# Patient Record
Sex: FEMALE | Race: BLACK OR AFRICAN AMERICAN | NOT HISPANIC OR LATINO | URBAN - METROPOLITAN AREA
[De-identification: names, ages, dates, MRNs, and addresses within clinical notes are randomized per-mention and may not be internally consistent; named-entity substitution may affect disease eponyms.]

---

## 2017-01-04 RX ORDER — DIPHENHYDRAMINE HCL 50 MG
50 CAPSULE ORAL ONCE
Qty: 0 | Refills: 0 | Status: DISCONTINUED | OUTPATIENT
Start: 2017-01-05 | End: 2017-01-20

## 2017-01-04 RX ORDER — TRASTUZUMAB-DKST 420 MG/20ML
537 INJECTION, POWDER, LYOPHILIZED, FOR SOLUTION INTRAVENOUS ONCE
Qty: 0 | Refills: 0 | Status: DISCONTINUED | OUTPATIENT
Start: 2017-01-05 | End: 2017-01-20

## 2017-01-04 RX ORDER — ACETAMINOPHEN 500 MG
1000 TABLET ORAL ONCE
Qty: 0 | Refills: 0 | Status: DISCONTINUED | OUTPATIENT
Start: 2017-01-05 | End: 2017-01-20

## 2017-01-05 ENCOUNTER — OUTPATIENT (OUTPATIENT)
Dept: OUTPATIENT SERVICES | Facility: HOSPITAL | Age: 62
LOS: 1 days | End: 2017-01-05
Payer: MEDICARE

## 2017-01-05 DIAGNOSIS — C50.919 MALIGNANT NEOPLASM OF UNSPECIFIED SITE OF UNSPECIFIED FEMALE BREAST: ICD-10-CM

## 2017-01-05 DIAGNOSIS — Z96.653 PRESENCE OF ARTIFICIAL KNEE JOINT, BILATERAL: Chronic | ICD-10-CM

## 2017-01-05 DIAGNOSIS — Z96.643 PRESENCE OF ARTIFICIAL HIP JOINT, BILATERAL: Chronic | ICD-10-CM

## 2017-01-05 DIAGNOSIS — Z98.89 OTHER SPECIFIED POSTPROCEDURAL STATES: Chronic | ICD-10-CM

## 2017-01-05 PROCEDURE — 96413 CHEMO IV INFUSION 1 HR: CPT

## 2017-01-18 ENCOUNTER — APPOINTMENT (OUTPATIENT)
Dept: RADIATION ONCOLOGY | Facility: CLINIC | Age: 62
End: 2017-01-18

## 2017-01-18 VITALS — SYSTOLIC BLOOD PRESSURE: 149 MMHG | HEART RATE: 86 BPM | OXYGEN SATURATION: 100 % | DIASTOLIC BLOOD PRESSURE: 84 MMHG

## 2017-01-26 ENCOUNTER — OUTPATIENT (OUTPATIENT)
Dept: OUTPATIENT SERVICES | Facility: HOSPITAL | Age: 62
LOS: 1 days | End: 2017-01-26
Payer: MEDICARE

## 2017-01-26 DIAGNOSIS — C50.919 MALIGNANT NEOPLASM OF UNSPECIFIED SITE OF UNSPECIFIED FEMALE BREAST: ICD-10-CM

## 2017-01-26 DIAGNOSIS — Z96.653 PRESENCE OF ARTIFICIAL KNEE JOINT, BILATERAL: Chronic | ICD-10-CM

## 2017-01-26 DIAGNOSIS — Z98.89 OTHER SPECIFIED POSTPROCEDURAL STATES: Chronic | ICD-10-CM

## 2017-01-26 DIAGNOSIS — Z96.643 PRESENCE OF ARTIFICIAL HIP JOINT, BILATERAL: Chronic | ICD-10-CM

## 2017-01-26 PROCEDURE — 96413 CHEMO IV INFUSION 1 HR: CPT

## 2017-01-26 RX ORDER — ACETAMINOPHEN 500 MG
1000 TABLET ORAL ONCE
Qty: 0 | Refills: 0 | Status: DISCONTINUED | OUTPATIENT
Start: 2017-01-26 | End: 2017-02-10

## 2017-01-26 RX ORDER — TRASTUZUMAB-DKST 420 MG/20ML
542 INJECTION, POWDER, LYOPHILIZED, FOR SOLUTION INTRAVENOUS ONCE
Qty: 0 | Refills: 0 | Status: DISCONTINUED | OUTPATIENT
Start: 2017-01-26 | End: 2017-02-10

## 2017-01-26 RX ORDER — DIPHENHYDRAMINE HCL 50 MG
50 CAPSULE ORAL ONCE
Qty: 0 | Refills: 0 | Status: DISCONTINUED | OUTPATIENT
Start: 2017-01-26 | End: 2017-02-10

## 2017-02-16 ENCOUNTER — OUTPATIENT (OUTPATIENT)
Dept: OUTPATIENT SERVICES | Facility: HOSPITAL | Age: 62
LOS: 1 days | End: 2017-02-16

## 2017-02-16 DIAGNOSIS — Z96.653 PRESENCE OF ARTIFICIAL KNEE JOINT, BILATERAL: Chronic | ICD-10-CM

## 2017-02-16 DIAGNOSIS — C50.919 MALIGNANT NEOPLASM OF UNSPECIFIED SITE OF UNSPECIFIED FEMALE BREAST: ICD-10-CM

## 2017-02-16 DIAGNOSIS — Z98.89 OTHER SPECIFIED POSTPROCEDURAL STATES: Chronic | ICD-10-CM

## 2017-02-16 DIAGNOSIS — Z96.643 PRESENCE OF ARTIFICIAL HIP JOINT, BILATERAL: Chronic | ICD-10-CM

## 2017-03-07 ENCOUNTER — OUTPATIENT (OUTPATIENT)
Dept: OUTPATIENT SERVICES | Facility: HOSPITAL | Age: 62
LOS: 1 days | End: 2017-03-07
Payer: MEDICARE

## 2017-03-07 DIAGNOSIS — Z98.89 OTHER SPECIFIED POSTPROCEDURAL STATES: Chronic | ICD-10-CM

## 2017-03-07 DIAGNOSIS — Z96.653 PRESENCE OF ARTIFICIAL KNEE JOINT, BILATERAL: Chronic | ICD-10-CM

## 2017-03-07 DIAGNOSIS — C50.912 MALIGNANT NEOPLASM OF UNSPECIFIED SITE OF LEFT FEMALE BREAST: ICD-10-CM

## 2017-03-07 DIAGNOSIS — Z96.643 PRESENCE OF ARTIFICIAL HIP JOINT, BILATERAL: Chronic | ICD-10-CM

## 2017-03-07 LAB
ALBUMIN SERPL ELPH-MCNC: 4 G/DL — SIGNIFICANT CHANGE UP (ref 3.4–5)
ALP SERPL-CCNC: 141 U/L — HIGH (ref 40–120)
ALT FLD-CCNC: 16 U/L — SIGNIFICANT CHANGE UP (ref 12–42)
ANION GAP SERPL CALC-SCNC: 7 MMOL/L — LOW (ref 9–16)
APTT BLD: 28.4 SEC — SIGNIFICANT CHANGE UP (ref 27.5–37.4)
AST SERPL-CCNC: 36 U/L — SIGNIFICANT CHANGE UP (ref 15–37)
BILIRUB SERPL-MCNC: 0.8 MG/DL — SIGNIFICANT CHANGE UP (ref 0.2–1.2)
BUN SERPL-MCNC: 13 MG/DL — SIGNIFICANT CHANGE UP (ref 7–23)
CALCIUM SERPL-MCNC: 9.1 MG/DL — SIGNIFICANT CHANGE UP (ref 8.5–10.5)
CHLORIDE SERPL-SCNC: 106 MMOL/L — SIGNIFICANT CHANGE UP (ref 96–108)
CO2 SERPL-SCNC: 26 MMOL/L — SIGNIFICANT CHANGE UP (ref 22–31)
CREAT SERPL-MCNC: 0.93 MG/DL — SIGNIFICANT CHANGE UP (ref 0.5–1.3)
GLUCOSE SERPL-MCNC: 125 MG/DL — HIGH (ref 70–99)
HCT VFR BLD CALC: 37.6 % — SIGNIFICANT CHANGE UP (ref 34.5–45)
HGB BLD-MCNC: 12.8 G/DL — SIGNIFICANT CHANGE UP (ref 11.5–15.5)
INR BLD: 1.04 — SIGNIFICANT CHANGE UP (ref 0.88–1.16)
MCHC RBC-ENTMCNC: 30 PG — SIGNIFICANT CHANGE UP (ref 27–34)
MCHC RBC-ENTMCNC: 34 G/DL — SIGNIFICANT CHANGE UP (ref 32–36)
MCV RBC AUTO: 88.3 FL — SIGNIFICANT CHANGE UP (ref 80–100)
PLATELET # BLD AUTO: 161 K/UL — SIGNIFICANT CHANGE UP (ref 150–400)
POTASSIUM SERPL-MCNC: 3.8 MMOL/L — SIGNIFICANT CHANGE UP (ref 3.5–5.3)
POTASSIUM SERPL-SCNC: 3.8 MMOL/L — SIGNIFICANT CHANGE UP (ref 3.5–5.3)
PROT SERPL-MCNC: 6.9 G/DL — SIGNIFICANT CHANGE UP (ref 6.4–8.2)
PROTHROM AB SERPL-ACNC: 11.5 SEC — SIGNIFICANT CHANGE UP (ref 10–13.1)
RBC # BLD: 4.26 M/UL — SIGNIFICANT CHANGE UP (ref 3.8–5.2)
RBC # FLD: 13.5 % — SIGNIFICANT CHANGE UP (ref 10.3–16.9)
SODIUM SERPL-SCNC: 139 MMOL/L — SIGNIFICANT CHANGE UP (ref 135–145)
WBC # BLD: 3.2 K/UL — LOW (ref 3.8–10.5)
WBC # FLD AUTO: 3.2 K/UL — LOW (ref 3.8–10.5)

## 2017-03-07 PROCEDURE — 85027 COMPLETE CBC AUTOMATED: CPT

## 2017-03-07 PROCEDURE — 85730 THROMBOPLASTIN TIME PARTIAL: CPT

## 2017-03-07 PROCEDURE — 85610 PROTHROMBIN TIME: CPT

## 2017-03-07 PROCEDURE — 80053 COMPREHEN METABOLIC PANEL: CPT

## 2017-03-09 ENCOUNTER — OUTPATIENT (OUTPATIENT)
Dept: OUTPATIENT SERVICES | Facility: HOSPITAL | Age: 62
LOS: 1 days | End: 2017-03-09
Payer: MEDICARE

## 2017-03-09 DIAGNOSIS — Z98.89 OTHER SPECIFIED POSTPROCEDURAL STATES: Chronic | ICD-10-CM

## 2017-03-09 DIAGNOSIS — Z96.643 PRESENCE OF ARTIFICIAL HIP JOINT, BILATERAL: Chronic | ICD-10-CM

## 2017-03-09 DIAGNOSIS — Z96.653 PRESENCE OF ARTIFICIAL KNEE JOINT, BILATERAL: Chronic | ICD-10-CM

## 2017-03-09 PROCEDURE — 36590 REMOVAL TUNNELED CV CATH: CPT

## 2017-04-07 ENCOUNTER — APPOINTMENT (OUTPATIENT)
Dept: BREAST CENTER | Facility: CLINIC | Age: 62
End: 2017-04-07

## 2017-04-07 VITALS
HEART RATE: 105 BPM | TEMPERATURE: 97.9 F | SYSTOLIC BLOOD PRESSURE: 144 MMHG | DIASTOLIC BLOOD PRESSURE: 89 MMHG | BODY MASS INDEX: 33.66 KG/M2 | HEIGHT: 63 IN | WEIGHT: 190 LBS

## 2017-05-09 ENCOUNTER — FORM ENCOUNTER (OUTPATIENT)
Age: 62
End: 2017-05-09

## 2017-05-10 ENCOUNTER — OUTPATIENT (OUTPATIENT)
Dept: OUTPATIENT SERVICES | Facility: HOSPITAL | Age: 62
LOS: 1 days | End: 2017-05-10
Payer: MEDICARE

## 2017-05-10 DIAGNOSIS — Z98.89 OTHER SPECIFIED POSTPROCEDURAL STATES: Chronic | ICD-10-CM

## 2017-05-10 DIAGNOSIS — Z96.653 PRESENCE OF ARTIFICIAL KNEE JOINT, BILATERAL: Chronic | ICD-10-CM

## 2017-05-10 DIAGNOSIS — Z96.643 PRESENCE OF ARTIFICIAL HIP JOINT, BILATERAL: Chronic | ICD-10-CM

## 2017-05-10 PROCEDURE — 77065 DX MAMMO INCL CAD UNI: CPT

## 2017-05-10 PROCEDURE — G0206: CPT | Mod: 26,LT

## 2017-08-14 ENCOUNTER — OTHER (OUTPATIENT)
Age: 62
End: 2017-08-14

## 2017-08-16 ENCOUNTER — APPOINTMENT (OUTPATIENT)
Dept: RADIATION ONCOLOGY | Facility: CLINIC | Age: 62
End: 2017-08-16
Payer: MEDICARE

## 2017-08-16 VITALS
BODY MASS INDEX: 35.81 KG/M2 | OXYGEN SATURATION: 99 % | HEART RATE: 89 BPM | DIASTOLIC BLOOD PRESSURE: 90 MMHG | WEIGHT: 202.13 LBS | SYSTOLIC BLOOD PRESSURE: 149 MMHG

## 2017-08-16 PROCEDURE — 99214 OFFICE O/P EST MOD 30 MIN: CPT

## 2017-10-11 ENCOUNTER — APPOINTMENT (OUTPATIENT)
Dept: BREAST CENTER | Facility: CLINIC | Age: 62
End: 2017-10-11
Payer: MEDICARE

## 2017-10-11 VITALS
SYSTOLIC BLOOD PRESSURE: 150 MMHG | HEIGHT: 63 IN | TEMPERATURE: 97.5 F | HEART RATE: 104 BPM | WEIGHT: 196 LBS | DIASTOLIC BLOOD PRESSURE: 93 MMHG | BODY MASS INDEX: 34.73 KG/M2

## 2017-10-11 DIAGNOSIS — I89.0 LYMPHEDEMA, NOT ELSEWHERE CLASSIFIED: ICD-10-CM

## 2017-10-11 PROCEDURE — 99214 OFFICE O/P EST MOD 30 MIN: CPT

## 2017-11-27 ENCOUNTER — FORM ENCOUNTER (OUTPATIENT)
Age: 62
End: 2017-11-27

## 2017-11-28 ENCOUNTER — OUTPATIENT (OUTPATIENT)
Dept: OUTPATIENT SERVICES | Facility: HOSPITAL | Age: 62
LOS: 1 days | End: 2017-11-28
Payer: MEDICARE

## 2017-11-28 DIAGNOSIS — Z96.653 PRESENCE OF ARTIFICIAL KNEE JOINT, BILATERAL: Chronic | ICD-10-CM

## 2017-11-28 DIAGNOSIS — Z96.643 PRESENCE OF ARTIFICIAL HIP JOINT, BILATERAL: Chronic | ICD-10-CM

## 2017-11-28 DIAGNOSIS — Z98.89 OTHER SPECIFIED POSTPROCEDURAL STATES: Chronic | ICD-10-CM

## 2017-11-28 PROCEDURE — 77049 MRI BREAST C-+ W/CAD BI: CPT

## 2017-11-28 PROCEDURE — A9585: CPT

## 2017-11-28 PROCEDURE — 77059 MRI BREAST BILATERAL: CPT | Mod: 26

## 2017-11-28 PROCEDURE — G0204: CPT | Mod: 26

## 2017-11-28 PROCEDURE — C8937: CPT

## 2017-11-28 PROCEDURE — 0159T: CPT | Mod: 26

## 2017-11-28 PROCEDURE — 77066 DX MAMMO INCL CAD BI: CPT

## 2018-02-21 ENCOUNTER — APPOINTMENT (OUTPATIENT)
Dept: RADIATION ONCOLOGY | Facility: CLINIC | Age: 63
End: 2018-02-21

## 2018-03-28 ENCOUNTER — APPOINTMENT (OUTPATIENT)
Age: 63
End: 2018-03-28
Payer: MEDICARE

## 2018-05-23 ENCOUNTER — APPOINTMENT (OUTPATIENT)
Dept: BREAST CENTER | Facility: CLINIC | Age: 63
End: 2018-05-23
Payer: MEDICARE

## 2018-05-23 VITALS
HEIGHT: 63 IN | HEART RATE: 109 BPM | WEIGHT: 196 LBS | SYSTOLIC BLOOD PRESSURE: 125 MMHG | TEMPERATURE: 97.6 F | DIASTOLIC BLOOD PRESSURE: 81 MMHG | BODY MASS INDEX: 34.73 KG/M2

## 2018-05-23 PROCEDURE — 99213 OFFICE O/P EST LOW 20 MIN: CPT

## 2018-09-05 ENCOUNTER — APPOINTMENT (OUTPATIENT)
Age: 63
End: 2018-09-05
Payer: MEDICARE

## 2018-09-05 PROCEDURE — 99214 OFFICE O/P EST MOD 30 MIN: CPT

## 2018-12-07 ENCOUNTER — APPOINTMENT (OUTPATIENT)
Dept: BREAST CENTER | Facility: CLINIC | Age: 63
End: 2018-12-07
Payer: MEDICARE

## 2018-12-07 VITALS
HEIGHT: 63 IN | BODY MASS INDEX: 34.73 KG/M2 | WEIGHT: 196 LBS | SYSTOLIC BLOOD PRESSURE: 151 MMHG | HEART RATE: 93 BPM | DIASTOLIC BLOOD PRESSURE: 85 MMHG

## 2018-12-07 PROCEDURE — 99213 OFFICE O/P EST LOW 20 MIN: CPT

## 2019-01-15 ENCOUNTER — FORM ENCOUNTER (OUTPATIENT)
Age: 64
End: 2019-01-15

## 2019-01-16 ENCOUNTER — APPOINTMENT (OUTPATIENT)
Dept: MAMMOGRAPHY | Facility: HOSPITAL | Age: 64
End: 2019-01-16
Payer: MEDICARE

## 2019-01-16 ENCOUNTER — OUTPATIENT (OUTPATIENT)
Dept: OUTPATIENT SERVICES | Facility: HOSPITAL | Age: 64
LOS: 1 days | End: 2019-01-16
Payer: MEDICARE

## 2019-01-16 DIAGNOSIS — Z98.89 OTHER SPECIFIED POSTPROCEDURAL STATES: Chronic | ICD-10-CM

## 2019-01-16 DIAGNOSIS — Z96.653 PRESENCE OF ARTIFICIAL KNEE JOINT, BILATERAL: Chronic | ICD-10-CM

## 2019-01-16 DIAGNOSIS — Z96.643 PRESENCE OF ARTIFICIAL HIP JOINT, BILATERAL: Chronic | ICD-10-CM

## 2019-01-16 PROCEDURE — 77063 BREAST TOMOSYNTHESIS BI: CPT | Mod: 26

## 2019-01-16 PROCEDURE — 77063 BREAST TOMOSYNTHESIS BI: CPT

## 2019-01-16 PROCEDURE — 77067 SCR MAMMO BI INCL CAD: CPT

## 2019-01-16 PROCEDURE — 77067 SCR MAMMO BI INCL CAD: CPT | Mod: 26

## 2019-01-23 ENCOUNTER — CHART COPY (OUTPATIENT)
Age: 64
End: 2019-01-23

## 2019-02-26 ENCOUNTER — FORM ENCOUNTER (OUTPATIENT)
Age: 64
End: 2019-02-26

## 2019-02-27 ENCOUNTER — APPOINTMENT (OUTPATIENT)
Dept: MRI IMAGING | Facility: HOSPITAL | Age: 64
End: 2019-02-27
Payer: MEDICARE

## 2019-02-27 ENCOUNTER — OUTPATIENT (OUTPATIENT)
Dept: OUTPATIENT SERVICES | Facility: HOSPITAL | Age: 64
LOS: 1 days | End: 2019-02-27
Payer: MEDICARE

## 2019-02-27 DIAGNOSIS — Z96.643 PRESENCE OF ARTIFICIAL HIP JOINT, BILATERAL: Chronic | ICD-10-CM

## 2019-02-27 DIAGNOSIS — Z96.653 PRESENCE OF ARTIFICIAL KNEE JOINT, BILATERAL: Chronic | ICD-10-CM

## 2019-02-27 DIAGNOSIS — Z98.89 OTHER SPECIFIED POSTPROCEDURAL STATES: Chronic | ICD-10-CM

## 2019-02-27 PROCEDURE — 77049 MRI BREAST C-+ W/CAD BI: CPT | Mod: 26

## 2019-02-27 PROCEDURE — A9585: CPT

## 2019-02-27 PROCEDURE — C8937: CPT

## 2019-02-27 PROCEDURE — 77049 MRI BREAST C-+ W/CAD BI: CPT

## 2019-03-13 ENCOUNTER — APPOINTMENT (OUTPATIENT)
Dept: RADIATION ONCOLOGY | Facility: CLINIC | Age: 64
End: 2019-03-13

## 2019-04-04 ENCOUNTER — APPOINTMENT (OUTPATIENT)
Dept: ULTRASOUND IMAGING | Facility: HOSPITAL | Age: 64
End: 2019-04-04
Payer: MEDICARE

## 2019-04-04 ENCOUNTER — OUTPATIENT (OUTPATIENT)
Dept: OUTPATIENT SERVICES | Facility: HOSPITAL | Age: 64
LOS: 1 days | End: 2019-04-04
Payer: MEDICARE

## 2019-04-04 DIAGNOSIS — Z96.653 PRESENCE OF ARTIFICIAL KNEE JOINT, BILATERAL: Chronic | ICD-10-CM

## 2019-04-04 DIAGNOSIS — Z96.643 PRESENCE OF ARTIFICIAL HIP JOINT, BILATERAL: Chronic | ICD-10-CM

## 2019-04-04 DIAGNOSIS — Z98.89 OTHER SPECIFIED POSTPROCEDURAL STATES: Chronic | ICD-10-CM

## 2019-04-04 PROCEDURE — 76642 ULTRASOUND BREAST LIMITED: CPT | Mod: 26,50

## 2019-04-04 PROCEDURE — 76642 ULTRASOUND BREAST LIMITED: CPT

## 2019-05-16 ENCOUNTER — RESULT REVIEW (OUTPATIENT)
Age: 64
End: 2019-05-16

## 2019-05-17 ENCOUNTER — APPOINTMENT (OUTPATIENT)
Dept: MRI IMAGING | Facility: HOSPITAL | Age: 64
End: 2019-05-17
Payer: MEDICARE

## 2019-05-17 ENCOUNTER — OUTPATIENT (OUTPATIENT)
Dept: OUTPATIENT SERVICES | Facility: HOSPITAL | Age: 64
LOS: 1 days | End: 2019-05-17
Payer: MEDICARE

## 2019-05-17 DIAGNOSIS — Z98.89 OTHER SPECIFIED POSTPROCEDURAL STATES: Chronic | ICD-10-CM

## 2019-05-17 DIAGNOSIS — Z96.653 PRESENCE OF ARTIFICIAL KNEE JOINT, BILATERAL: Chronic | ICD-10-CM

## 2019-05-17 DIAGNOSIS — Z96.643 PRESENCE OF ARTIFICIAL HIP JOINT, BILATERAL: Chronic | ICD-10-CM

## 2019-05-17 PROCEDURE — A4648: CPT

## 2019-05-17 PROCEDURE — A9585: CPT

## 2019-05-17 PROCEDURE — 77065 DX MAMMO INCL CAD UNI: CPT

## 2019-05-17 PROCEDURE — 19085 BX BREAST 1ST LESION MR IMAG: CPT | Mod: LT

## 2019-05-17 PROCEDURE — 19085 BX BREAST 1ST LESION MR IMAG: CPT

## 2019-05-17 PROCEDURE — 77065 DX MAMMO INCL CAD UNI: CPT | Mod: 26,LT

## 2019-05-17 PROCEDURE — 88305 TISSUE EXAM BY PATHOLOGIST: CPT

## 2019-05-17 PROCEDURE — 88341 IMHCHEM/IMCYTCHM EA ADD ANTB: CPT

## 2019-05-24 LAB — SURGICAL PATHOLOGY STUDY: SIGNIFICANT CHANGE UP

## 2019-06-03 ENCOUNTER — RESULT REVIEW (OUTPATIENT)
Age: 64
End: 2019-06-03

## 2019-06-05 ENCOUNTER — APPOINTMENT (OUTPATIENT)
Dept: ULTRASOUND IMAGING | Facility: HOSPITAL | Age: 64
End: 2019-06-05

## 2019-06-11 ENCOUNTER — RESULT REVIEW (OUTPATIENT)
Age: 64
End: 2019-06-11

## 2019-06-14 ENCOUNTER — APPOINTMENT (OUTPATIENT)
Dept: BREAST CENTER | Facility: CLINIC | Age: 64
End: 2019-06-14
Payer: MEDICARE

## 2019-06-14 VITALS
HEIGHT: 63 IN | HEART RATE: 91 BPM | SYSTOLIC BLOOD PRESSURE: 156 MMHG | DIASTOLIC BLOOD PRESSURE: 95 MMHG | BODY MASS INDEX: 36.32 KG/M2 | WEIGHT: 205 LBS

## 2019-06-14 DIAGNOSIS — Z78.9 OTHER SPECIFIED HEALTH STATUS: ICD-10-CM

## 2019-06-14 DIAGNOSIS — Z62.21 CHILD IN WELFARE CUSTODY: ICD-10-CM

## 2019-06-14 PROCEDURE — 99215 OFFICE O/P EST HI 40 MIN: CPT

## 2019-06-14 RX ORDER — FLUTICASONE PROPIONATE AND SALMETEROL 50; 250 UG/1; UG/1
250-50 POWDER RESPIRATORY (INHALATION)
Refills: 0 | Status: ACTIVE | COMMUNITY

## 2019-06-14 RX ORDER — AMLODIPINE BESYLATE 10 MG/1
10 TABLET ORAL
Refills: 0 | Status: ACTIVE | COMMUNITY

## 2019-06-14 RX ORDER — AMLODIPINE BESYLATE 5 MG/1
5 TABLET ORAL
Refills: 0 | Status: DISCONTINUED | COMMUNITY
End: 2019-06-14

## 2019-06-14 RX ORDER — ALBUTEROL SULFATE 90 UG/1
108 (90 BASE) AEROSOL, METERED RESPIRATORY (INHALATION)
Refills: 0 | Status: ACTIVE | COMMUNITY

## 2019-06-14 NOTE — PHYSICAL EXAM
[Normocephalic] : normocephalic [Atraumatic] : atraumatic [Supple] : supple [No Supraclavicular Adenopathy] : no supraclavicular adenopathy [No Thyromegaly] : no thyromegaly [Examined in the supine and seated position] : examined in the supine and seated position [Bra Size: ___] : Bra Size: [unfilled] [Asymmetrical] : asymmetrical [No dominant masses] : no dominant masses left breast [No dominant masses] : no dominant masses in right breast  [No Nipple Retraction] : no right nipple retraction [No Nipple Discharge] : no right nipple discharge [No Axillary Lymphadenopathy] : no left axillary lymphadenopathy [No Swelling] : no swelling [No Edema] : no edema [No Rashes] : no rashes [Full ROM] : full range of motion [No Ulceration] : no ulceration [de-identified] : Pendulous breasts.   [de-identified] : PA inc but postop distortion and post radiation changes. Axillary scar from ALND.

## 2019-06-14 NOTE — PAST MEDICAL HISTORY
[Postmenopausal] : The patient is postmenopausal [Menarche Age ____] : age at menarche was [unfilled] [Menopause Age____] : age at menopause was [unfilled] [unknown] : the patient is unsure of the date of her LMP [Total Preg ___] : G[unfilled] [Live Births ___] : P[unfilled]  [Living ___] : Living: [unfilled] [Age At Live Birth ___] : Age at live birth: [unfilled] [History of Hormone Replacement Treatment] : has no history of hormone replacement treatment [FreeTextEntry5] : - 1982 [FreeTextEntry6] : None [FreeTextEntry7] : Hx of oral contraceptive pills, stopped more than 20 years ago [FreeTextEntry8] : None

## 2019-06-14 NOTE — ASSESSMENT
[FreeTextEntry1] : Brandi is a 63 yo female previously under the care of Dr. Peterson, she was last evaluated in December 2018. Pt with hx of  left breast cancer  (ER+/[place]-/HER2 +), s/p neoadjuvant chemo, wide local excision and ALND (2/29 LNS positive) in 2016. S/p XRT in 2017. On letrozole, managed by Dr. Eid (med onc). \par She does not know her family history, as she was in foster care when she was a child. \par \par -- 1/16/19 ([facility]) b/l mmg: left breast small group of microcalcs, below the lumpectomy site, recommend additional spot compression views. Left breast, subcentimeter nodularity behind the nipple adjacent to lumpectomy site; patient is scheduled for f/u breast MRI. BI-RADS 0. \par -- 2/17/19 ([facility]) MRI breasts: 1 cm enhancing lesion at 7N16.5 in the right breast, recommend US bx if amendable to US. Left breast, 0.6 cm enhancing lesion within the skin at 10N4.5 recommend targeted US. Left breast 0.5 cm 10N6.5 suspicious enhancing lesion, recommend targeted US with biopsy is amendable, otherwise MRI bx is recommended. BI-RADS 4. \par -- 4/4/19 ([facility]) US breasts b/l: No lesion visualized to correspond to the right breast lesion at 7n16.5 seen on MRI; however pt reports a history of a "boil" which has resolved, which may have corresponded, this can be confirmed on the day of the left breast MRI bx. Left breast 0.8 x 0.7 cm hypoechoic lesion within the skin at 10N4.5 corresponds to 0.6 cm lesion seen on MRI; given hx of cancer, biopsy is recommended. No sono abnormality seen at the 10N6.5 to correspond to 0.5 cm lesion seen on MRI; MRI bx is recommended. BI-RADS 4.\par -- 5/17/19 ([facility]) left breast 10n4.5 MRI biopsy path: minute narrow linear structure with atypical epithelial cells. Non-epithelial lined cyst 1.75 mm w/ fibrous wall c/w old fat necrosis; rare minute atrophic lobules showing alterations c/w treatment effect. This biopsy does not explain the presence of the 0.5 cm lesion seen on MRI, please correlate with imaging studies.\par CBE: Post radiation and postop changes on the left with PA incision. Right pendulous breasts. No adenopathy and no LE and has full ROM. \par Written concordance pending, discussed with Dr. Gar, rec is for exc bx of this lesion. Also for the 5 mm lesion at 10N 6.5, rec is for MRI for possible rebx or exc bx with MRI loc at time of surgery of the atypia lesion. \par Discussed with pt recommendations and options. For the 10:00 4.5N atypia, rec is for localized exc bx. For the 5 mm lesion seen only on BRANT 10 N6.5, options are for MRI for possible bx vs MRI localized exc bx at same time as the atypia lesion. IF this option is chosen, would do MRI wire loc x2.  If any malignancy is found, pt would need mastectomy on the left as she has already had BCT.  ALso reviewed option of mastectomy on left, possible prophylactic vs oncoplastic on right given grooves on shoulders and size of breasts and asymmetry.  Could have recon on the left, option to meet with plastic discussed as well as options of types of recon.  PT states for now she desires the wire loc x2 and excisional biopsy. Pt also offered option for genetic testing and she will think about it. \par Will schedule for left breast MRI loc exc biopsy x2.  Discussed procedure and possible second surgery pending results.  Discussed given location of lesions, may not be able to use PA incisions. Will depend on the localization.

## 2019-06-14 NOTE — DATA REVIEWED
[FreeTextEntry1] : \par -- 1/16/19 ([facility]) b/l mmg: left breast small group of microcalcs, below the lumpectomy site, recommend additional spot compression views. Left breast, subcentimeter nodularity behind the nipple adjacent to lumpectomy site; patient is scheduled for f/u breast MRI. BI-RADS 0. \par -- 2/17/19 ([facility]) MRI breasts: 1 cm enhancing lesion at 7N16.5 in the right breast, recommend US bx if amendable to US. Left breast, 0.6 cm enhancing lesion within the skin at 10N4.5 recommend targeted US. Left breast 0.5 cm 10N6.5 suspicious enhancing lesion, recommend targeted US with biopsy is amendable, otherwise MRI bx is recommended. BI-RADS 4. \par -- 4/4/19 ([facility]) US breasts b/l: No lesion visualized to correspond to the right breast lesion at 7n16.5 seen on MRI; however pt reports a history of a "boil" which has resolved, which may have corresponded, this can be confirmed on the day of the left breast MRI bx. Left breast 0.8 x 0.7 cm hypoechoic lesion within the skin at 10N4.5 corresponds to 0.6 cm lesion seen on MRI; given hx of cancer, biopsy is recommended. No sono abnormality seen at the 10N6.5 to correspond to 0.5 cm lesion seen on MRI; MRI bx is recommended. BI-RADS 4.\par -- 5/17/19 ([facility]) left breast 10n4.5 MRI biopsy path: minute narrow linear structure with atypical epithelial cells. Non-epithelial lined cyst 1.75 mm w/ fibrous wall c/w old fat necrosis; rare minute atrophic lobules showing alterations c/w treatment effect. This biopsy does not explain the presence of the 0.5 cm lesion seen on MRI, please correlate with imaging studies. Written concordance pending, discussed with Dr. Gar, rec is for exc bx of this lesion. Also for the 5 mm lesion at 10N 6.5, rec is for MRI for possible rebx or exc bx with MRI loc at time of surgery of the atypia lesion. \par

## 2019-06-14 NOTE — HISTORY OF PRESENT ILLNESS
[FreeTextEntry1] : Brandi is a 63 yo female previously under the care of Dr. Peterson, she was last evaluated in December 2018. Pt with hx of  left breast cancer  (ER+/[place]-/HER2 +), s/p neoadjuvant chemo, wide local excision and ALND (2/29 LNS positive) in 2016. S/p XRT in 2017. On letrozole, managed by Dr. Eid (med onc). \par She does not know her family history, as she was in foster care when she was a child. \par \par -- 1/16/19 ([facility]) b/l mmg: left breast small group of microcalcs, below the lumpectomy site, recommend additional spot compression views. Left breast, subcentimeter nodularity behind the nipple adjacent to lumpectomy site; patient is scheduled for f/u breast MRI. BI-RADS 0. \par -- 2/17/19 ([facility]) MRI breasts: 1 cm enhancing lesion at 7N16.5 in the right breast, recommend US bx if amendable to US. Left breast, 0.6 cm enhancing lesion within the skin at 10N4.5 recommend targeted US. Left breast 0.5 cm 10N6.5 suspicious enhancing lesion, recommend targeted US with biopsy is amendable, otherwise MRI bx is recommended. BI-RADS 4. \par -- 4/4/19 ([facility]) US breasts b/l: No lesion visualized to correspond to the right breast lesion at 7n16.5 seen on MRI; however pt reports a history of a "boil" which has resolved, which may have corresponded, this can be confirmed on the day of the left breast MRI bx. Left breast 0.8 x 0.7 cm hypoechoic lesion within the skin at 10N4.5 corresponds to 0.6 cm lesion seen on MRI; given hx of cancer, biopsy is recommended. No sono abnormality seen at the 10N6.5 to correspond to 0.5 cm lesion seen on MRI; MRI bx is recommended. BI-RADS 4.\par -- 5/17/19 ([facility]) left breast 10n4.5 MRI biopsy path: minute narrow linear structure with atypical epithelial cells. Non-epithelial lined cyst 1.75 mm w/ fibrous wall c/w old fat necrosis; rare minute atrophic lobules showing alterations c/w treatment effect. This biopsy does not explain the presence of the 0.5 cm lesion seen on MRI, please correlate with imaging studies. CONCORDANCE PENDING (radiology was contacted and will look into this). \par Pt without breast complaints.

## 2019-06-25 ENCOUNTER — CHART COPY (OUTPATIENT)
Age: 64
End: 2019-06-25

## 2019-06-25 ENCOUNTER — MESSAGE (OUTPATIENT)
Age: 64
End: 2019-06-25

## 2019-06-26 ENCOUNTER — MESSAGE (OUTPATIENT)
Age: 64
End: 2019-06-26

## 2019-07-22 VITALS
WEIGHT: 201.94 LBS | TEMPERATURE: 98 F | HEART RATE: 91 BPM | SYSTOLIC BLOOD PRESSURE: 163 MMHG | DIASTOLIC BLOOD PRESSURE: 80 MMHG | OXYGEN SATURATION: 99 % | HEIGHT: 63 IN | RESPIRATION RATE: 16 BRPM

## 2019-07-22 NOTE — ASU PATIENT PROFILE, ADULT - PSH
H/O bilateral hip replacements    H/O  section    H/O total knee replacement, bilateral    S/P lumpectomy, left breast  Left axillary node dissection 3/14/16

## 2019-07-23 ENCOUNTER — RESULT REVIEW (OUTPATIENT)
Age: 64
End: 2019-07-23

## 2019-07-23 ENCOUNTER — APPOINTMENT (OUTPATIENT)
Dept: BREAST CENTER | Facility: HOSPITAL | Age: 64
End: 2019-07-23
Payer: MEDICARE

## 2019-07-23 ENCOUNTER — APPOINTMENT (OUTPATIENT)
Dept: MRI IMAGING | Facility: HOSPITAL | Age: 64
End: 2019-07-23

## 2019-07-23 ENCOUNTER — OUTPATIENT (OUTPATIENT)
Dept: OUTPATIENT SERVICES | Facility: HOSPITAL | Age: 64
LOS: 1 days | Discharge: HOME | End: 2019-07-23
Payer: COMMERCIAL

## 2019-07-23 VITALS — SYSTOLIC BLOOD PRESSURE: 135 MMHG | DIASTOLIC BLOOD PRESSURE: 78 MMHG | HEART RATE: 88 BPM | OXYGEN SATURATION: 94 %

## 2019-07-23 DIAGNOSIS — Z96.643 PRESENCE OF ARTIFICIAL HIP JOINT, BILATERAL: Chronic | ICD-10-CM

## 2019-07-23 DIAGNOSIS — Z98.89 OTHER SPECIFIED POSTPROCEDURAL STATES: Chronic | ICD-10-CM

## 2019-07-23 DIAGNOSIS — Z96.653 PRESENCE OF ARTIFICIAL KNEE JOINT, BILATERAL: Chronic | ICD-10-CM

## 2019-07-23 DIAGNOSIS — Z98.890 OTHER SPECIFIED POSTPROCEDURAL STATES: Chronic | ICD-10-CM

## 2019-07-23 PROCEDURE — 76098 X-RAY EXAM SURGICAL SPECIMEN: CPT | Mod: 26

## 2019-07-23 PROCEDURE — 19301 PARTIAL MASTECTOMY: CPT | Mod: LT

## 2019-07-23 PROCEDURE — 88307 TISSUE EXAM BY PATHOLOGIST: CPT

## 2019-07-23 PROCEDURE — C1819: CPT

## 2019-07-23 PROCEDURE — 19287 PERQ DEV BREAST 1ST MR GUIDE: CPT

## 2019-07-23 PROCEDURE — 77065 DX MAMMO INCL CAD UNI: CPT | Mod: 26,LT

## 2019-07-23 PROCEDURE — 76098 X-RAY EXAM SURGICAL SPECIMEN: CPT

## 2019-07-23 PROCEDURE — A9585: CPT

## 2019-07-23 PROCEDURE — 77065 DX MAMMO INCL CAD UNI: CPT

## 2019-07-23 PROCEDURE — 19287 PERQ DEV BREAST 1ST MR GUIDE: CPT | Mod: LT

## 2019-07-23 RX ORDER — ACETAMINOPHEN WITH CODEINE 300MG-30MG
1 TABLET ORAL
Qty: 20 | Refills: 0
Start: 2019-07-23 | End: 2019-07-27

## 2019-07-23 RX ORDER — HYDROMORPHONE HYDROCHLORIDE 2 MG/ML
0.4 INJECTION INTRAMUSCULAR; INTRAVENOUS; SUBCUTANEOUS
Refills: 0 | Status: DISCONTINUED | OUTPATIENT
Start: 2019-07-23 | End: 2019-07-24

## 2019-07-23 NOTE — PACU DISCHARGE NOTE - COMMENTS
discharge instructions given to patient and family who verbalized understanding, denies pain, nausea and vomiting, cleared by Anesthesiologist for discharge home.

## 2019-07-29 ENCOUNTER — APPOINTMENT (OUTPATIENT)
Dept: BREAST CENTER | Facility: CLINIC | Age: 64
End: 2019-07-29
Payer: MEDICARE

## 2019-07-29 VITALS — HEIGHT: 63 IN | DIASTOLIC BLOOD PRESSURE: 89 MMHG | HEART RATE: 104 BPM | SYSTOLIC BLOOD PRESSURE: 152 MMHG

## 2019-07-29 DIAGNOSIS — R92.8 OTHER ABNORMAL AND INCONCLUSIVE FINDINGS ON DIAGNOSTIC IMAGING OF BREAST: ICD-10-CM

## 2019-07-29 LAB — SURGICAL PATHOLOGY STUDY: SIGNIFICANT CHANGE UP

## 2019-07-29 PROCEDURE — 99024 POSTOP FOLLOW-UP VISIT: CPT

## 2019-07-29 NOTE — HISTORY OF PRESENT ILLNESS
[FreeTextEntry1] : Brandi is a 63 yo female presents for post op. She is 6 days s/p left wire loc x 2 excisional biopsy for atypical epithelial cells on 7/23/19. Final surgical pathology pending. \par \par Pt with hx of  left breast cancer  (ER+/[place]-/HER2 +), s/p neoadjuvant chemo, wide local excision and ALND (2/29 LNS positive) in 2016. S/p XRT in 2017. On letrozole, managed by Dr. Eid (med onc). \par She does not know her family history, as she was in foster care when she was a child. \par \par

## 2019-07-29 NOTE — PAST MEDICAL HISTORY
[Postmenopausal] : The patient is postmenopausal [Menarche Age ____] : age at menarche was [unfilled] [Menopause Age____] : age at menopause was [unfilled] [Total Preg ___] : G[unfilled] [unknown] : the patient is unsure of the date of her LMP [Live Births ___] : P[unfilled]  [Living ___] : Living: [unfilled] [Age At Live Birth ___] : Age at live birth: [unfilled] [History of Hormone Replacement Treatment] : has no history of hormone replacement treatment [FreeTextEntry6] : None [FreeTextEntry5] : - 1984 [FreeTextEntry7] : Hx of oral contraceptive pills, stopped more than 20 years ago [FreeTextEntry8] : None

## 2019-07-29 NOTE — DATA REVIEWED
[FreeTextEntry1] : \par -- 1/16/19 ([facility]) b/l mmg: left breast small group of microcalcs, below the lumpectomy site, recommend additional spot compression views. Left breast, subcentimeter nodularity behind the nipple adjacent to lumpectomy site; patient is scheduled for f/u breast MRI. BI-RADS 0. \par -- 2/17/19 ([facility]) MRI breasts: 1 cm enhancing lesion at 7N16.5 in the right breast, recommend US bx if amendable to US. Left breast, 0.6 cm enhancing lesion within the skin at 10N4.5 recommend targeted US. Left breast 0.5 cm 10N6.5 suspicious enhancing lesion, recommend targeted US with biopsy is amendable, otherwise MRI bx is recommended. BI-RADS 4. \par -- 4/4/19 ([facility]) US breasts b/l: No lesion visualized to correspond to the right breast lesion at 7n16.5 seen on MRI; however pt reports a history of a "boil" which has resolved, which may have corresponded, this can be confirmed on the day of the left breast MRI bx. Left breast 0.8 x 0.7 cm hypoechoic lesion within the skin at 10N4.5 corresponds to 0.6 cm lesion seen on MRI; given hx of cancer, biopsy is recommended. No sono abnormality seen at the 10N6.5 to correspond to 0.5 cm lesion seen on MRI; MRI bx is recommended. BI-RADS 4.\par -- 5/17/19 ([facility]) left breast 10n4.5 MRI biopsy path: minute narrow linear structure with atypical epithelial cells. Non-epithelial lined cyst 1.75 mm w/ fibrous wall c/w old fat necrosis; rare minute atrophic lobules showing alterations c/w treatment effect. This biopsy does not explain the presence of the 0.5 cm lesion seen on MRI, please correlate with imaging studies. Written concordance pending, discussed with Dr. Gar, rec is for exc bx of this lesion. Also for the 5 mm lesion at 10N 6.5, rec is for MRI for possible rebx or exc bx with MRI loc at time of surgery of the atypia lesion. \par --7/23/19 ([facility]) left breast wire loc x 2 excisional biopsy for atypia, final surgical pathology pending. ADDENDUM: Final surgical pathology 7/23/19 left breast excisional biopsy benign breast with sclerosing adenosis, fibrocystic changes and associated calcifications, prior biopsy site changes are present

## 2019-07-29 NOTE — PHYSICAL EXAM
[de-identified] : UIQ inc intact, no drainage noted and no evidence of cellulitis.  Peau d'orange from post radiation changes and dependent edema of the breast.

## 2019-07-29 NOTE — ASSESSMENT
[FreeTextEntry1] : Brandi is a 63 yo female presents for post op. She is 6 days s/p left wire loc x 2 excisional biopsy for atypical epithelial cells on 7/23/19. Final surgical pathology pending.  Here with her sister Heather. Pt still thinking about genetic testing. \par Pt not needing pain Rx. \par Pt noticed a little drainage from incision.\par Pt with hx of  left breast cancer  (ER+/[place]-/HER2 +), s/p neoadjuvant chemo, wide local excision and ALND (2/29 LNS positive) in 2016. S/p XRT in 2017. On letrozole, managed by Dr. Eid (med onc). \par She does not know her family history, as she was in foster care when she was a child. \par UIQ inc intact, no drainage noted and no evidence of cellulitis.  Peau d'orange from post radiation changes and dependent edema of the breast.

## 2019-08-23 ENCOUNTER — APPOINTMENT (OUTPATIENT)
Dept: BREAST CENTER | Facility: CLINIC | Age: 64
End: 2019-08-23
Payer: MEDICARE

## 2019-08-23 VITALS
DIASTOLIC BLOOD PRESSURE: 84 MMHG | BODY MASS INDEX: 35.97 KG/M2 | SYSTOLIC BLOOD PRESSURE: 141 MMHG | WEIGHT: 203 LBS | HEIGHT: 63 IN | HEART RATE: 104 BPM

## 2019-08-23 DIAGNOSIS — Z17.0 MALIGNANT NEOPLASM OF CENTRAL PORTION OF LEFT FEMALE BREAST: ICD-10-CM

## 2019-08-23 DIAGNOSIS — C50.112 MALIGNANT NEOPLASM OF CENTRAL PORTION OF LEFT FEMALE BREAST: ICD-10-CM

## 2019-08-23 DIAGNOSIS — C50.919 MALIGNANT NEOPLASM OF UNSPECIFIED SITE OF UNSPECIFIED FEMALE BREAST: ICD-10-CM

## 2019-08-23 DIAGNOSIS — C77.3 MALIGNANT NEOPLASM OF UNSPECIFIED SITE OF UNSPECIFIED FEMALE BREAST: ICD-10-CM

## 2019-08-23 DIAGNOSIS — N60.82 OTHER BENIGN MAMMARY DYSPLASIAS OF LEFT BREAST: ICD-10-CM

## 2019-08-23 DIAGNOSIS — Z85.3 PERSONAL HISTORY OF MALIGNANT NEOPLASM OF BREAST: ICD-10-CM

## 2019-08-23 PROCEDURE — 99024 POSTOP FOLLOW-UP VISIT: CPT

## 2019-08-23 NOTE — HISTORY OF PRESENT ILLNESS
[FreeTextEntry1] : Brandi is a 63 yo female presents for post op. She is s/p left wire loc x 2 excisional biopsy for atypical epithelial cells on 7/23/19. She is here for a 6-week incision check and to review surgical pathology.\par \par Final surgical pathology 7/23/19 left breast excisional biopsy benign breast with sclerosing adenosis, fibrocystic changes and associated calcifications, prior biopsy site changes are present.\par \par Pt with hx of  left breast cancer  (ER+/[place]-/HER2 +), s/p neoadjuvant chemo, wide local excision and ALND (2/29 LNS positive) in 2016. S/p XRT in 2017. On letrozole, managed by Dr. Eid (med onc). \par She does not know her family history, as she was in foster care when she was a child. \par \par -- 1/16/19 ([facility]) b/l mmg: left breast small group of microcalcs, below the lumpectomy site, recommend additional spot compression views. Left breast, subcentimeter nodularity behind the nipple adjacent to lumpectomy site; patient is scheduled for f/u breast MRI. BI-RADS 0. \par -- 2/17/19 ([facility]) MRI breasts: 1 cm enhancing lesion at 7N16.5 in the right breast, recommend US bx if amendable to US. Left breast, 0.6 cm enhancing lesion within the skin at 10N4.5 recommend targeted US. Left breast 0.5 cm 10N6.5 suspicious enhancing lesion, recommend targeted US with biopsy is amendable, otherwise MRI bx is recommended. BI-RADS 4. \par -- 4/4/19 ([facility]) US breasts b/l: No lesion visualized to correspond to the right breast lesion at 7n16.5 seen on MRI; however pt reports a history of a "boil" which has resolved, which may have corresponded, this can be confirmed on the day of the left breast MRI bx. Left breast 0.8 x 0.7 cm hypoechoic lesion within the skin at 10N4.5 corresponds to 0.6 cm lesion seen on MRI; given hx of cancer, biopsy is recommended. No sono abnormality seen at the 10N6.5 to correspond to 0.5 cm lesion seen on MRI; MRI bx is recommended. BI-RADS 4.\par -- 5/17/19 ([facility]) left breast 10n4.5 MRI biopsy path: minute narrow linear structure with atypical epithelial cells. Non-epithelial lined cyst 1.75 mm w/ fibrous wall c/w old fat necrosis; rare minute atrophic lobules showing alterations c/w treatment effect. This biopsy does not explain the presence of the 0.5 cm lesion seen on MRI, please correlate with imaging studies. Written concordance pending, discussed with Dr. Gar, rec is for exc bx of this lesion. Also for the 5 mm lesion at 10N 6.5, rec is for MRI for possible rebx or exc bx with MRI loc at time of surgery of the atypia lesion. \par --7/23/19 ([facility]) left breast wire loc x 2 excisional biopsy for atypia, final surgical pathology pending.  Final surgical pathology: left breast excisional biopsy benign breast with sclerosing adenosis, fibrocystic changes and associated calcifications, prior biopsy site changes are present \par \par

## 2019-08-23 NOTE — DATA REVIEWED
[FreeTextEntry1] : -- 1/16/19 ([facility]) b/l mmg: left breast small group of microcalcs, below the lumpectomy site, recommend additional spot compression views. Left breast, subcentimeter nodularity behind the nipple adjacent to lumpectomy site; patient is scheduled for f/u breast MRI. BI-RADS 0. \par -- 2/17/19 ([facility]) MRI breasts: 1 cm enhancing lesion at 7N16.5 in the right breast, recommend US bx if amendable to US. Left breast, 0.6 cm enhancing lesion within the skin at 10N4.5 recommend targeted US. Left breast 0.5 cm 10N6.5 suspicious enhancing lesion, recommend targeted US with biopsy is amendable, otherwise MRI bx is recommended. BI-RADS 4. \par -- 4/4/19 ([facility]) US breasts b/l: No lesion visualized to correspond to the right breast lesion at 7n16.5 seen on MRI; however pt reports a history of a "boil" which has resolved, which may have corresponded, this can be confirmed on the day of the left breast MRI bx. Left breast 0.8 x 0.7 cm hypoechoic lesion within the skin at 10N4.5 corresponds to 0.6 cm lesion seen on MRI; given hx of cancer, biopsy is recommended. No sono abnormality seen at the 10N6.5 to correspond to 0.5 cm lesion seen on MRI; MRI bx is recommended. BI-RADS 4.\par -- 5/17/19 ([facility]) left breast 10n4.5 MRI biopsy path: minute narrow linear structure with atypical epithelial cells. Non-epithelial lined cyst 1.75 mm w/ fibrous wall c/w old fat necrosis; rare minute atrophic lobules showing alterations c/w treatment effect. This biopsy does not explain the presence of the 0.5 cm lesion seen on MRI, please correlate with imaging studies. Written concordance pending, discussed with Dr. Gar, rec is for exc bx of this lesion. Also for the 5 mm lesion at 10N 6.5, rec is for MRI for possible rebx or exc bx with MRI loc at time of surgery of the atypia lesion. \par --7/23/19 ([facility]) left breast wire loc x 2 excisional biopsy for atypia, final surgical pathology pending. ADDENDUM: Final surgical pathology 7/23/19 left breast excisional biopsy benign breast with sclerosing adenosis, fibrocystic changes and associated calcifications, prior biopsy site changes are present

## 2019-08-23 NOTE — PAST MEDICAL HISTORY
[Postmenopausal] : The patient is postmenopausal [Menarche Age ____] : age at menarche was [unfilled] [unknown] : the patient is unsure of the date of her LMP [Menopause Age____] : age at menopause was [unfilled] [Total Preg ___] : G[unfilled] [Live Births ___] : P[unfilled]  [Living ___] : Living: [unfilled] [Age At Live Birth ___] : Age at live birth: [unfilled] [History of Hormone Replacement Treatment] : has no history of hormone replacement treatment [FreeTextEntry5] : - 1984 [FreeTextEntry6] : None [FreeTextEntry7] : Hx of oral contraceptive pills, stopped more than 20 years ago [FreeTextEntry8] : None

## 2019-08-23 NOTE — ASSESSMENT
[FreeTextEntry1] : Brandi is a 65 yo female presents for post op. She is s/p left wire loc x 2 excisional biopsy for atypical epithelial cells on 7/23/19. She is here for a 6-week incision check and to review surgical pathology.\par \par Final surgical pathology 7/23/19 left breast excisional biopsy benign breast with sclerosing adenosis, fibrocystic changes and associated calcifications, prior biopsy site changes are present.\par \par Pt with hx of  left breast cancer  (ER+/[place]-/HER2 +), s/p neoadjuvant chemo, wide local excision and ALND (2/29 LNS positive) in 2016. S/p XRT in 2017. On letrozole, managed by Dr. Eid (med onc). \par She does not know her family history, as she was in foster care when she was a child. \par \par -- 1/16/19 ([facility]) b/l mmg: left breast small group of microcalcs, below the lumpectomy site, recommend additional spot compression views. Left breast, subcentimeter nodularity behind the nipple adjacent to lumpectomy site; patient is scheduled for f/u breast MRI. BI-RADS 0. \par -- 2/17/19 ([facility]) MRI breasts: 1 cm enhancing lesion at 7N16.5 in the right breast, recommend US bx if amendable to US. Left breast, 0.6 cm enhancing lesion within the skin at 10N4.5 recommend targeted US. Left breast 0.5 cm 10N6.5 suspicious enhancing lesion, recommend targeted US with biopsy is amendable, otherwise MRI bx is recommended. BI-RADS 4. \par -- 4/4/19 ([facility]) US breasts b/l: No lesion visualized to correspond to the right breast lesion at 7n16.5 seen on MRI; however pt reports a history of a "boil" which has resolved, which may have corresponded, this can be confirmed on the day of the left breast MRI bx. Left breast 0.8 x 0.7 cm hypoechoic lesion within the skin at 10N4.5 corresponds to 0.6 cm lesion seen on MRI; given hx of cancer, biopsy is recommended. No sono abnormality seen at the 10N6.5 to correspond to 0.5 cm lesion seen on MRI; MRI bx is recommended. BI-RADS 4.\par -- 5/17/19 ([facility]) left breast 10n4.5 MRI biopsy path: minute narrow linear structure with atypical epithelial cells. Non-epithelial lined cyst 1.75 mm w/ fibrous wall c/w old fat necrosis; rare minute atrophic lobules showing alterations c/w treatment effect. This biopsy does not explain the presence of the 0.5 cm lesion seen on MRI, please correlate with imaging studies. Written concordance pending, discussed with Dr. Gar, rec is for exc bx of this lesion. Also for the 5 mm lesion at 10N 6.5, rec is for MRI for possible rebx or exc bx with MRI loc at time of surgery of the atypia lesion. \par --7/23/19 ([facility]) left breast wire loc x 2 excisional biopsy for atypia, final surgical pathology pending.  Final surgical pathology: left breast excisional biopsy benign breast with sclerosing adenosis, fibrocystic changes and associated calcifications, prior biopsy site changes are present \par CBE: Left UIQ inc intact and healing, post radiation and surgical changes.  \par Reviewed path, left 7/23/19, benign.  Postop mmg due 1/20, f.u after.

## 2019-08-23 NOTE — PHYSICAL EXAM
[de-identified] : UIQ inc intact, no drainage noted and no evidence of cellulitis.  Peau d'orange from post radiation changes and dependent edema of the breast.

## 2020-01-15 ENCOUNTER — APPOINTMENT (OUTPATIENT)
Dept: BREAST CENTER | Facility: CLINIC | Age: 65
End: 2020-01-15
Payer: MEDICARE

## 2020-01-15 VITALS
BODY MASS INDEX: 35.97 KG/M2 | HEART RATE: 105 BPM | WEIGHT: 203 LBS | SYSTOLIC BLOOD PRESSURE: 140 MMHG | OXYGEN SATURATION: 97 % | DIASTOLIC BLOOD PRESSURE: 84 MMHG | HEIGHT: 63 IN | TEMPERATURE: 97.9 F

## 2020-01-15 PROCEDURE — 99213 OFFICE O/P EST LOW 20 MIN: CPT

## 2020-01-15 RX ORDER — NITROFURANTOIN (MONOHYDRATE/MACROCRYSTALS) 25; 75 MG/1; MG/1
100 CAPSULE ORAL
Qty: 14 | Refills: 0 | Status: DISCONTINUED | COMMUNITY
Start: 2020-01-03

## 2020-01-16 NOTE — PHYSICAL EXAM
[Normocephalic] : normocephalic [Atraumatic] : atraumatic [Supple] : supple [No Supraclavicular Adenopathy] : no supraclavicular adenopathy [No dominant masses] : no dominant masses in right breast  [Examined in the supine and seated position] : examined in the supine and seated position [No dominant masses] : no dominant masses left breast [No Nipple Retraction] : no left nipple retraction [No Nipple Discharge] : no left nipple discharge [No Axillary Lymphadenopathy] : no left axillary lymphadenopathy [No Edema] : no edema [No Rashes] : no rashes [No Ulceration] : no ulceration

## 2020-01-16 NOTE — REASON FOR VISIT
[Consultation] : a consultation visit [FreeTextEntry1] : previous hx of left 11:00 invasive mammary cancer (ER positive 40%/[place]-/HER2 equivocal FISH positive) with left axillary lymph node positive for metastatic adenocarcinoma, consistent with breast origin, LCIS and ALH, s/p neoadjuvant chemo, wide local excision and ALND (2/29 LNS positive) in 2016.

## 2020-01-16 NOTE — HISTORY OF PRESENT ILLNESS
[FreeTextEntry1] : Brandi is a 65 yo female previously under the care of Dr. Clarissa Turner, was last evaluated in August 2019. She has a previous hx of left 11:00 invasive mammary cancer (ER positive 40%/[place]-/HER2 equivocal FISH positive) with left axillary lymph node positive for metastatic adenocarcinoma, consistent with breast origin, LCIS and ALH, s/p neoadjuvant chemo, wide local excision and ALND (2/29 LNS positive) in 2016. S/p XRT in 2017. On letrozole, managed by Dr. Eid (med onc) of which she followed up with last month.\par \par To note she underwent a left wire localized excisional biopsy x 2 for atypical epithelial cells on 7/23/19 with benign final surgical pathology. \par \par Discussed importance and implication of genetic testing. Patient understands and would like to think about it.\par \par \par

## 2020-01-16 NOTE — PAST MEDICAL HISTORY
[Postmenopausal] : The patient is postmenopausal [Menarche Age ____] : age at menarche was [unfilled] [Menopause Age____] : age at menopause was [unfilled] [unknown] : the patient is unsure of the date of her LMP [Total Preg ___] : G[unfilled] [Live Births ___] : P[unfilled]  [Living ___] : Living: [unfilled] [Age At Live Birth ___] : Age at live birth: [unfilled] [FreeTextEntry5] : - 1984 [History of Hormone Replacement Treatment] : has no history of hormone replacement treatment [FreeTextEntry6] : None [FreeTextEntry7] : Hx of oral contraceptive pills, stopped more than 20 years ago [FreeTextEntry8] : None

## 2020-01-16 NOTE — DATA REVIEWED
[FreeTextEntry1] : -- 1/16/19 ([facility]) b/l mmg: left breast small group of microcalcs, below the lumpectomy site, recommend additional spot compression views. Left breast, subcentimeter nodularity behind the nipple adjacent to lumpectomy site; patient is scheduled for f/u breast MRI. BI-RADS 0. \par \par -- 2/17/19 ([facility]) MRI breasts: 1 cm enhancing lesion at 7N16.5 in the right breast, recommend US bx if amendable to US. Left breast, 0.6 cm enhancing lesion within the skin at 10N4.5 recommend targeted US. Left breast 0.5 cm 10N6.5 suspicious enhancing lesion, recommend targeted US with biopsy is amendable, otherwise MRI bx is recommended. BI-RADS 4. \par \par -- 4/4/19 ([facility]) US breasts b/l: No lesion visualized to correspond to the right breast lesion at 7n16.5 seen on MRI; however pt reports a history of a "boil" which has resolved, which may have corresponded, this can be confirmed on the day of the left breast MRI bx. Left breast 0.8 x 0.7 cm hypoechoic lesion within the skin at 10N4.5 corresponds to 0.6 cm lesion seen on MRI; given hx of cancer, biopsy is recommended. No sono abnormality seen at the 10N6.5 to correspond to 0.5 cm lesion seen on MRI; MRI bx is recommended. BI-RADS 4.\par \par -- 5/17/19 ([facility]) left breast 10n4.5 MRI biopsy path: minute narrow linear structure with atypical epithelial cells. Non-epithelial lined cyst 1.75 mm w/ fibrous wall c/w old fat necrosis; rare minute atrophic lobules showing alterations c/w treatment effect. This biopsy does not explain the presence of the 0.5 cm lesion seen on MRI, please correlate with imaging studies. CONCORDANCE PENDING (radiology was contacted and will look into this). \par

## 2020-03-09 ENCOUNTER — APPOINTMENT (OUTPATIENT)
Dept: MAMMOGRAPHY | Facility: HOSPITAL | Age: 65
End: 2020-03-09
Payer: MEDICARE

## 2020-03-09 ENCOUNTER — OUTPATIENT (OUTPATIENT)
Dept: OUTPATIENT SERVICES | Facility: HOSPITAL | Age: 65
LOS: 1 days | End: 2020-03-09
Payer: MEDICARE

## 2020-03-09 DIAGNOSIS — Z96.643 PRESENCE OF ARTIFICIAL HIP JOINT, BILATERAL: Chronic | ICD-10-CM

## 2020-03-09 DIAGNOSIS — Z96.653 PRESENCE OF ARTIFICIAL KNEE JOINT, BILATERAL: Chronic | ICD-10-CM

## 2020-03-09 DIAGNOSIS — Z98.89 OTHER SPECIFIED POSTPROCEDURAL STATES: Chronic | ICD-10-CM

## 2020-03-09 DIAGNOSIS — Z98.890 OTHER SPECIFIED POSTPROCEDURAL STATES: Chronic | ICD-10-CM

## 2020-03-09 PROCEDURE — 77067 SCR MAMMO BI INCL CAD: CPT

## 2020-03-09 PROCEDURE — 77063 BREAST TOMOSYNTHESIS BI: CPT

## 2020-03-09 PROCEDURE — 77063 BREAST TOMOSYNTHESIS BI: CPT | Mod: 26

## 2020-03-09 PROCEDURE — 77067 SCR MAMMO BI INCL CAD: CPT | Mod: 26

## 2020-03-27 DIAGNOSIS — N61.1 ABSCESS OF THE BREAST AND NIPPLE: ICD-10-CM

## 2020-03-27 RX ORDER — SULFAMETHOXAZOLE AND TRIMETHOPRIM 800; 160 MG/1; MG/1
800-160 TABLET ORAL
Qty: 22 | Refills: 0 | Status: COMPLETED | COMMUNITY
Start: 2020-03-27 | End: 2020-04-06

## 2020-03-30 ENCOUNTER — OUTPATIENT (OUTPATIENT)
Dept: OUTPATIENT SERVICES | Facility: HOSPITAL | Age: 65
LOS: 1 days | End: 2020-03-30
Payer: COMMERCIAL

## 2020-03-30 ENCOUNTER — APPOINTMENT (OUTPATIENT)
Dept: ULTRASOUND IMAGING | Facility: HOSPITAL | Age: 65
End: 2020-03-30

## 2020-03-30 ENCOUNTER — INPATIENT (INPATIENT)
Facility: HOSPITAL | Age: 65
LOS: 2 days | Discharge: HOME | DRG: 871 | End: 2020-04-02
Attending: INTERNAL MEDICINE | Admitting: STUDENT IN AN ORGANIZED HEALTH CARE EDUCATION/TRAINING PROGRAM
Payer: MEDICARE

## 2020-03-30 ENCOUNTER — APPOINTMENT (OUTPATIENT)
Dept: BREAST CENTER | Facility: CLINIC | Age: 65
End: 2020-03-30

## 2020-03-30 ENCOUNTER — RESULT REVIEW (OUTPATIENT)
Age: 65
End: 2020-03-30

## 2020-03-30 VITALS
DIASTOLIC BLOOD PRESSURE: 77 MMHG | SYSTOLIC BLOOD PRESSURE: 147 MMHG | HEART RATE: 126 BPM | HEIGHT: 63 IN | RESPIRATION RATE: 24 BRPM | OXYGEN SATURATION: 92 % | TEMPERATURE: 99 F | WEIGHT: 195.11 LBS

## 2020-03-30 DIAGNOSIS — Z98.890 OTHER SPECIFIED POSTPROCEDURAL STATES: Chronic | ICD-10-CM

## 2020-03-30 DIAGNOSIS — R06.02 SHORTNESS OF BREATH: ICD-10-CM

## 2020-03-30 DIAGNOSIS — Z71.89 OTHER SPECIFIED COUNSELING: ICD-10-CM

## 2020-03-30 DIAGNOSIS — Z96.643 PRESENCE OF ARTIFICIAL HIP JOINT, BILATERAL: Chronic | ICD-10-CM

## 2020-03-30 DIAGNOSIS — Z29.9 ENCOUNTER FOR PROPHYLACTIC MEASURES, UNSPECIFIED: ICD-10-CM

## 2020-03-30 DIAGNOSIS — Z96.653 PRESENCE OF ARTIFICIAL KNEE JOINT, BILATERAL: Chronic | ICD-10-CM

## 2020-03-30 DIAGNOSIS — J45.909 UNSPECIFIED ASTHMA, UNCOMPLICATED: ICD-10-CM

## 2020-03-30 DIAGNOSIS — J96.01 ACUTE RESPIRATORY FAILURE WITH HYPOXIA: ICD-10-CM

## 2020-03-30 DIAGNOSIS — E66.9 OBESITY, UNSPECIFIED: ICD-10-CM

## 2020-03-30 DIAGNOSIS — N61.1 ABSCESS OF THE BREAST AND NIPPLE: ICD-10-CM

## 2020-03-30 DIAGNOSIS — A41.9 SEPSIS, UNSPECIFIED ORGANISM: ICD-10-CM

## 2020-03-30 DIAGNOSIS — Z98.89 OTHER SPECIFIED POSTPROCEDURAL STATES: Chronic | ICD-10-CM

## 2020-03-30 DIAGNOSIS — B97.21 SARS-ASSOCIATED CORONAVIRUS AS THE CAUSE OF DISEASES CLASSIFIED ELSEWHERE: ICD-10-CM

## 2020-03-30 DIAGNOSIS — C50.919 MALIGNANT NEOPLASM OF UNSPECIFIED SITE OF UNSPECIFIED FEMALE BREAST: ICD-10-CM

## 2020-03-30 LAB
ALBUMIN SERPL ELPH-MCNC: 3.6 G/DL — SIGNIFICANT CHANGE UP (ref 3.3–5)
ALBUMIN SERPL ELPH-MCNC: 3.8 G/DL — SIGNIFICANT CHANGE UP (ref 3.3–5)
ALP SERPL-CCNC: 92 U/L — SIGNIFICANT CHANGE UP (ref 40–120)
ALP SERPL-CCNC: SIGNIFICANT CHANGE UP U/L (ref 40–120)
ALT FLD-CCNC: 13 U/L — SIGNIFICANT CHANGE UP (ref 10–45)
ALT FLD-CCNC: SIGNIFICANT CHANGE UP U/L (ref 10–45)
ANION GAP SERPL CALC-SCNC: 16 MMOL/L — SIGNIFICANT CHANGE UP (ref 5–17)
ANION GAP SERPL CALC-SCNC: 16 MMOL/L — SIGNIFICANT CHANGE UP (ref 5–17)
APTT BLD: 29.5 SEC — SIGNIFICANT CHANGE UP (ref 27.5–36.3)
AST SERPL-CCNC: 62 U/L — HIGH (ref 10–40)
AST SERPL-CCNC: SIGNIFICANT CHANGE UP U/L (ref 10–40)
BASOPHILS # BLD AUTO: 0 K/UL — SIGNIFICANT CHANGE UP (ref 0–0.2)
BASOPHILS # BLD AUTO: 0 K/UL — SIGNIFICANT CHANGE UP (ref 0–0.2)
BASOPHILS NFR BLD AUTO: 0 % — SIGNIFICANT CHANGE UP (ref 0–2)
BASOPHILS NFR BLD AUTO: 0 % — SIGNIFICANT CHANGE UP (ref 0–2)
BILIRUB SERPL-MCNC: 0.7 MG/DL — SIGNIFICANT CHANGE UP (ref 0.2–1.2)
BILIRUB SERPL-MCNC: 0.7 MG/DL — SIGNIFICANT CHANGE UP (ref 0.2–1.2)
BUN SERPL-MCNC: 10 MG/DL — SIGNIFICANT CHANGE UP (ref 7–23)
BUN SERPL-MCNC: 10 MG/DL — SIGNIFICANT CHANGE UP (ref 7–23)
CALCIUM SERPL-MCNC: 8.7 MG/DL — SIGNIFICANT CHANGE UP (ref 8.4–10.5)
CALCIUM SERPL-MCNC: 9 MG/DL — SIGNIFICANT CHANGE UP (ref 8.4–10.5)
CHLORIDE SERPL-SCNC: 102 MMOL/L — SIGNIFICANT CHANGE UP (ref 96–108)
CHLORIDE SERPL-SCNC: 104 MMOL/L — SIGNIFICANT CHANGE UP (ref 96–108)
CO2 SERPL-SCNC: 18 MMOL/L — LOW (ref 22–31)
CO2 SERPL-SCNC: 18 MMOL/L — LOW (ref 22–31)
CREAT SERPL-MCNC: 0.96 MG/DL — SIGNIFICANT CHANGE UP (ref 0.5–1.3)
CREAT SERPL-MCNC: 1.03 MG/DL — SIGNIFICANT CHANGE UP (ref 0.5–1.3)
CRP SERPL-MCNC: 8.73 MG/DL — HIGH (ref 0–0.4)
D DIMER BLD IA.RAPID-MCNC: 384 NG/ML DDU — HIGH
D DIMER BLD IA.RAPID-MCNC: 411 NG/ML DDU — HIGH
EOSINOPHIL # BLD AUTO: 0 K/UL — SIGNIFICANT CHANGE UP (ref 0–0.5)
EOSINOPHIL # BLD AUTO: 0.01 K/UL — SIGNIFICANT CHANGE UP (ref 0–0.5)
EOSINOPHIL NFR BLD AUTO: 0 % — SIGNIFICANT CHANGE UP (ref 0–6)
EOSINOPHIL NFR BLD AUTO: 0.3 % — SIGNIFICANT CHANGE UP (ref 0–6)
ERYTHROCYTE [SEDIMENTATION RATE] IN BLOOD: 41 MM/HR — HIGH
ERYTHROCYTE [SEDIMENTATION RATE] IN BLOOD: 60 MM/HR — HIGH
FERRITIN SERPL-MCNC: 559 NG/ML — HIGH (ref 15–150)
FERRITIN SERPL-MCNC: 560 NG/ML — HIGH (ref 15–150)
GIANT PLATELETS BLD QL SMEAR: PRESENT — SIGNIFICANT CHANGE UP
GLUCOSE SERPL-MCNC: 100 MG/DL — HIGH (ref 70–99)
GLUCOSE SERPL-MCNC: 99 MG/DL — SIGNIFICANT CHANGE UP (ref 70–99)
GRAM STN FLD: SIGNIFICANT CHANGE UP
HBA1C BLD-MCNC: 5.7 % — HIGH (ref 4–5.6)
HCT VFR BLD CALC: 36.9 % — SIGNIFICANT CHANGE UP (ref 34.5–45)
HCT VFR BLD CALC: 39.4 % — SIGNIFICANT CHANGE UP (ref 34.5–45)
HGB BLD-MCNC: 12.1 G/DL — SIGNIFICANT CHANGE UP (ref 11.5–15.5)
HGB BLD-MCNC: 12.9 G/DL — SIGNIFICANT CHANGE UP (ref 11.5–15.5)
HYPOCHROMIA BLD QL: SLIGHT — SIGNIFICANT CHANGE UP
IMM GRANULOCYTES NFR BLD AUTO: 0.5 % — SIGNIFICANT CHANGE UP (ref 0–1.5)
INR BLD: 1.07 — SIGNIFICANT CHANGE UP (ref 0.88–1.16)
LACTATE SERPL-SCNC: 1.1 MMOL/L — SIGNIFICANT CHANGE UP (ref 0.5–2)
LYMPHOCYTES # BLD AUTO: 0.23 K/UL — LOW (ref 1–3.3)
LYMPHOCYTES # BLD AUTO: 0.62 K/UL — LOW (ref 1–3.3)
LYMPHOCYTES # BLD AUTO: 16.3 % — SIGNIFICANT CHANGE UP (ref 13–44)
LYMPHOCYTES # BLD AUTO: 6.1 % — LOW (ref 13–44)
MAGNESIUM SERPL-MCNC: 2.1 MG/DL — SIGNIFICANT CHANGE UP (ref 1.6–2.6)
MANUAL SMEAR VERIFICATION: SIGNIFICANT CHANGE UP
MCHC RBC-ENTMCNC: 28.8 PG — SIGNIFICANT CHANGE UP (ref 27–34)
MCHC RBC-ENTMCNC: 29.1 PG — SIGNIFICANT CHANGE UP (ref 27–34)
MCHC RBC-ENTMCNC: 32.7 GM/DL — SIGNIFICANT CHANGE UP (ref 32–36)
MCHC RBC-ENTMCNC: 32.8 GM/DL — SIGNIFICANT CHANGE UP (ref 32–36)
MCV RBC AUTO: 87.9 FL — SIGNIFICANT CHANGE UP (ref 80–100)
MCV RBC AUTO: 88.7 FL — SIGNIFICANT CHANGE UP (ref 80–100)
MONOCYTES # BLD AUTO: 0.16 K/UL — SIGNIFICANT CHANGE UP (ref 0–0.9)
MONOCYTES # BLD AUTO: 0.18 K/UL — SIGNIFICANT CHANGE UP (ref 0–0.9)
MONOCYTES NFR BLD AUTO: 4.4 % — SIGNIFICANT CHANGE UP (ref 2–14)
MONOCYTES NFR BLD AUTO: 4.7 % — SIGNIFICANT CHANGE UP (ref 2–14)
NEUTROPHILS # BLD AUTO: 2.98 K/UL — SIGNIFICANT CHANGE UP (ref 1.8–7.4)
NEUTROPHILS # BLD AUTO: 3.28 K/UL — SIGNIFICANT CHANGE UP (ref 1.8–7.4)
NEUTROPHILS NFR BLD AUTO: 78.2 % — HIGH (ref 43–77)
NEUTROPHILS NFR BLD AUTO: 86 % — HIGH (ref 43–77)
NEUTS BAND # BLD: 2.6 % — SIGNIFICANT CHANGE UP (ref 0–8)
NRBC # BLD: 0 /100 WBCS — SIGNIFICANT CHANGE UP (ref 0–0)
NT-PROBNP SERPL-SCNC: 198 PG/ML — SIGNIFICANT CHANGE UP (ref 0–300)
OVALOCYTES BLD QL SMEAR: SLIGHT — SIGNIFICANT CHANGE UP
PLAT MORPH BLD: ABNORMAL
PLATELET # BLD AUTO: 168 K/UL — SIGNIFICANT CHANGE UP (ref 150–400)
PLATELET # BLD AUTO: 191 K/UL — SIGNIFICANT CHANGE UP (ref 150–400)
POIKILOCYTOSIS BLD QL AUTO: SLIGHT — SIGNIFICANT CHANGE UP
POLYCHROMASIA BLD QL SMEAR: SLIGHT — SIGNIFICANT CHANGE UP
POTASSIUM SERPL-MCNC: 4 MMOL/L — SIGNIFICANT CHANGE UP (ref 3.5–5.3)
POTASSIUM SERPL-MCNC: SIGNIFICANT CHANGE UP MMOL/L (ref 3.5–5.3)
POTASSIUM SERPL-SCNC: 4 MMOL/L — SIGNIFICANT CHANGE UP (ref 3.5–5.3)
POTASSIUM SERPL-SCNC: SIGNIFICANT CHANGE UP MMOL/L (ref 3.5–5.3)
PROCALCITONIN SERPL-MCNC: 0.11 NG/ML — HIGH (ref 0.02–0.1)
PROT SERPL-MCNC: 7 G/DL — SIGNIFICANT CHANGE UP (ref 6–8.3)
PROT SERPL-MCNC: 7.2 G/DL — SIGNIFICANT CHANGE UP (ref 6–8.3)
PROTHROM AB SERPL-ACNC: 12.2 SEC — SIGNIFICANT CHANGE UP (ref 10–12.9)
RBC # BLD: 4.2 M/UL — SIGNIFICANT CHANGE UP (ref 3.8–5.2)
RBC # BLD: 4.44 M/UL — SIGNIFICANT CHANGE UP (ref 3.8–5.2)
RBC # FLD: 13.2 % — SIGNIFICANT CHANGE UP (ref 10.3–14.5)
RBC # FLD: 13.2 % — SIGNIFICANT CHANGE UP (ref 10.3–14.5)
RBC BLD AUTO: ABNORMAL
SARS-COV-2 RNA SPEC QL NAA+PROBE: DETECTED
SODIUM SERPL-SCNC: 136 MMOL/L — SIGNIFICANT CHANGE UP (ref 135–145)
SODIUM SERPL-SCNC: 138 MMOL/L — SIGNIFICANT CHANGE UP (ref 135–145)
SPECIMEN SOURCE: SIGNIFICANT CHANGE UP
SPHEROCYTES BLD QL SMEAR: SLIGHT — SIGNIFICANT CHANGE UP
TROPONIN T SERPL-MCNC: <0.01 NG/ML — SIGNIFICANT CHANGE UP (ref 0–0.01)
VARIANT LYMPHS # BLD: 0.9 % — SIGNIFICANT CHANGE UP (ref 0–6)
WBC # BLD: 3.7 K/UL — LOW (ref 3.8–10.5)
WBC # BLD: 3.81 K/UL — SIGNIFICANT CHANGE UP (ref 3.8–10.5)
WBC # FLD AUTO: 3.7 K/UL — LOW (ref 3.8–10.5)
WBC # FLD AUTO: 3.81 K/UL — SIGNIFICANT CHANGE UP (ref 3.8–10.5)

## 2020-03-30 PROCEDURE — 93010 ELECTROCARDIOGRAM REPORT: CPT

## 2020-03-30 PROCEDURE — 99285 EMERGENCY DEPT VISIT HI MDM: CPT

## 2020-03-30 PROCEDURE — 71045 X-RAY EXAM CHEST 1 VIEW: CPT | Mod: 26

## 2020-03-30 PROCEDURE — 19000 PUNCTURE ASPIR CYST BREAST: CPT | Mod: LT

## 2020-03-30 PROCEDURE — 19000 PUNCTURE ASPIR CYST BREAST: CPT

## 2020-03-30 PROCEDURE — 76642 ULTRASOUND BREAST LIMITED: CPT | Mod: 26,LT

## 2020-03-30 PROCEDURE — 76642 ULTRASOUND BREAST LIMITED: CPT

## 2020-03-30 PROCEDURE — 10005 FNA BX W/US GDN 1ST LES: CPT

## 2020-03-30 PROCEDURE — 76942 ECHO GUIDE FOR BIOPSY: CPT

## 2020-03-30 PROCEDURE — 99223 1ST HOSP IP/OBS HIGH 75: CPT | Mod: GC

## 2020-03-30 PROCEDURE — 76942 ECHO GUIDE FOR BIOPSY: CPT | Mod: 26,59

## 2020-03-30 RX ORDER — ALBUTEROL 90 UG/1
1 AEROSOL, METERED ORAL EVERY 6 HOURS
Refills: 0 | Status: DISCONTINUED | OUTPATIENT
Start: 2020-03-30 | End: 2020-03-31

## 2020-03-30 RX ORDER — INFLUENZA VIRUS VACCINE 15; 15; 15; 15 UG/.5ML; UG/.5ML; UG/.5ML; UG/.5ML
0.5 SUSPENSION INTRAMUSCULAR ONCE
Refills: 0 | Status: DISCONTINUED | OUTPATIENT
Start: 2020-03-30 | End: 2020-04-02

## 2020-03-30 RX ORDER — ACETAMINOPHEN 500 MG
650 TABLET ORAL ONCE
Refills: 0 | Status: COMPLETED | OUTPATIENT
Start: 2020-03-30 | End: 2020-03-30

## 2020-03-30 RX ORDER — FLUTICASONE PROPIONATE AND SALMETEROL 50; 250 UG/1; UG/1
0 POWDER ORAL; RESPIRATORY (INHALATION)
Qty: 0 | Refills: 0 | DISCHARGE

## 2020-03-30 RX ORDER — ALBUTEROL 90 UG/1
2 AEROSOL, METERED ORAL EVERY 4 HOURS
Refills: 0 | Status: DISCONTINUED | OUTPATIENT
Start: 2020-03-30 | End: 2020-03-31

## 2020-03-30 RX ORDER — ALBUTEROL 90 UG/1
2 AEROSOL, METERED ORAL
Qty: 0 | Refills: 0 | DISCHARGE

## 2020-03-30 RX ORDER — AMLODIPINE BESYLATE 2.5 MG/1
1 TABLET ORAL
Qty: 0 | Refills: 0 | DISCHARGE

## 2020-03-30 RX ORDER — ACETAMINOPHEN 500 MG
650 TABLET ORAL EVERY 6 HOURS
Refills: 0 | Status: DISCONTINUED | OUTPATIENT
Start: 2020-03-30 | End: 2020-04-02

## 2020-03-30 RX ORDER — BUDESONIDE AND FORMOTEROL FUMARATE DIHYDRATE 160; 4.5 UG/1; UG/1
2 AEROSOL RESPIRATORY (INHALATION)
Refills: 0 | Status: DISCONTINUED | OUTPATIENT
Start: 2020-03-30 | End: 2020-04-02

## 2020-03-30 RX ORDER — AMLODIPINE BESYLATE 2.5 MG/1
10 TABLET ORAL DAILY
Refills: 0 | Status: DISCONTINUED | OUTPATIENT
Start: 2020-03-30 | End: 2020-04-02

## 2020-03-30 RX ORDER — ALBUTEROL 90 UG/1
2 AEROSOL, METERED ORAL EVERY 4 HOURS
Refills: 0 | Status: DISCONTINUED | OUTPATIENT
Start: 2020-03-30 | End: 2020-03-30

## 2020-03-30 RX ORDER — LETROZOLE 2.5 MG/1
2.5 TABLET, FILM COATED ORAL DAILY
Refills: 0 | Status: DISCONTINUED | OUTPATIENT
Start: 2020-03-30 | End: 2020-04-02

## 2020-03-30 RX ORDER — LETROZOLE 2.5 MG/1
1 TABLET, FILM COATED ORAL
Qty: 0 | Refills: 0 | DISCHARGE

## 2020-03-30 RX ADMIN — BUDESONIDE AND FORMOTEROL FUMARATE DIHYDRATE 2 PUFF(S): 160; 4.5 AEROSOL RESPIRATORY (INHALATION) at 17:55

## 2020-03-30 RX ADMIN — Medication 100 MILLIGRAM(S): at 21:17

## 2020-03-30 RX ADMIN — Medication 650 MILLIGRAM(S): at 21:17

## 2020-03-30 RX ADMIN — Medication 650 MILLIGRAM(S): at 12:05

## 2020-03-30 RX ADMIN — LETROZOLE 2.5 MILLIGRAM(S): 2.5 TABLET, FILM COATED ORAL at 21:17

## 2020-03-30 NOTE — ED PROVIDER NOTE - PHYSICAL EXAMINATION
resp: mild tachypnea, CTAB.  RN chaperone: L breast medial aspect ~9 o clock position: +open wound where I and D performed, minimal surrounding firmness, no fluctuance/crepitus, no active draining. minimal surrounding warmth and localized swelling.

## 2020-03-30 NOTE — ED ADULT NURSE NOTE - ED CARDIAC CAPILLARY REFILL
Pt p/w cough, post tussive emesis and congestion starting Wednesday, and fever x 2 days. Dx with ear infection yesterday. Adequate PO intake and urine output. Pt asleep and easily arouses, wet cough, BS diminished with coarse crackles noted. Abdominal breathing and suprasternal retractions noted. Skin hot/dry, BCR. Radial pulse palpated.  NKDA, IUTD, no PMH
2 seconds or less

## 2020-03-30 NOTE — H&P ADULT - NSICDXPASTSURGICALHX_GEN_ALL_CORE_FT
PAST SURGICAL HISTORY:  H/O bilateral hip replacements     H/O  section     H/O total knee replacement, bilateral     S/P lumpectomy, left breast Left axillary node dissection 3/14/16

## 2020-03-30 NOTE — H&P ADULT - PROBLEM SELECTOR PLAN 8
hx of breast cancer in 2015. S/p chemo/radiation/lumpectomy. now on lestrozole.  - c/w lestrozole home dose

## 2020-03-30 NOTE — ED PROVIDER NOTE - PROGRESS NOTE DETAILS
Klepfish: some labs hemolyzed, repeat labs sent. US IV obtained RUE. CXR b/l infiltrates. satting low 90s RA, improved w/ NC. Much more comfortable at rest. Will admit for concern for COVID, hypoxic. Clinically does not require higher level of care at this time.   updated pt.

## 2020-03-30 NOTE — ED ADULT NURSE NOTE - NSIMPLEMENTINTERV_GEN_ALL_ED
Implemented All Universal Safety Interventions:  [place] to call system. Call bell, personal items and telephone within reach. Instruct patient to call for assistance. Room bathroom lighting operational. Non-slip footwear when patient is off stretcher. Physically safe environment: no spills, clutter or unnecessary equipment. Stretcher in lowest position, wheels locked, appropriate side rails in place.

## 2020-03-30 NOTE — H&P ADULT - HISTORY OF PRESENT ILLNESS
HPI:  65 obese F, with hx of asthma (LABA +ICS, PRN STAN, last exacerbation in fall, never intubated), breast cancer s/p chemo/radiation/lumpectomy in 2015 now on letrozole. Presents with x3 day history of RICHMOND and associated productive cough with reddish phlegm and speckled blood and decreased PO intake, no associated fevers, chills, cp, pleuritic cp, wheezing, or LE edema. Symptoms not alleviated with home inhaler. SOB worse with exertion. Patient with recent sick contact in  who is home with a cough but they have been sleeping on different floors. SOB felt worse today therefore presented to ED.    of note: Patient with recent breast pain, and diagnosed with breast abscess- now with diarrhea after her doctor prescribed her bactrim which she has taken for last three days. Now status post ID this am with her PCP.     Date of onset of fever: N/a  Date of onset of dyspnea: x3 days 3/28  Recent Travel: none  Sick Contacts: -cough/uri like symptoms  COVID Exposure: unknown  Close Contacts:  and daughter    ROS:  Fevers: Y/N: NO  Malaise: Y/N: YES  Myalgias: Y/N: no  SOB: Y/N  : YES       At rest: Y/N: NO         With exertion: Y/N: YES         At baseline: Y/ N: NO  Cough: Y/N : YES       Productive: Y/N: YES  Nausea: Y/N: NO  Vomiting: Y/N: NO  Diarrhea: Y/N: YES    PMHx:   HTN: Y/N: YES  DM: Y/N: NO  Lung Disease: Y/N: YES       Specify: ASTHMA  Cardiovascular disease: Y/N: NO  Malignancy: Y/N: HX of malignancy in 2015  Immunosuppression: Y/N: no  HIV: Y/N: no  CKD/ESRD: Y/N: no  Chronic Liver Disease: Y/N: no    SoHx:  Tobacco use: Y/N: NO  Vape use: Y/N: NO  Health care worker: Y/N: NO

## 2020-03-30 NOTE — H&P ADULT - NSHPLABSRESULTS_GEN_ALL_CORE
.  LABS:                         12.1   3.70  )-----------( 168      ( 30 Mar 2020 13:34 )             36.9     03-30    136  |  102  |  10  ----------------------------<  99  4.0   |  18<L>  |  1.03    Ca    8.7      30 Mar 2020 13:26  Mg     2.1     03-30    TPro  7.0  /  Alb  3.6  /  TBili  0.7  /  DBili  x   /  AST  62<H>  /  ALT  13  /  AlkPhos  92  03-30    PT/INR - ( 30 Mar 2020 13:26 )   PT: 12.2 sec;   INR: 1.07          PTT - ( 30 Mar 2020 13:26 )  PTT:29.5 sec    CARDIAC MARKERS ( 30 Mar 2020 13:26 )  x     / <0.01 ng/mL / x     / x     / x          Serum Pro-Brain Natriuretic Peptide: 198 pg/mL (03-30 @ 13:26)    Lactate, Blood: 1.1 mmol/L (03-30 @ 13:34)      RADIOLOGY, EKG & ADDITIONAL TESTS: Reviewed.

## 2020-03-30 NOTE — H&P ADULT - PROBLEM SELECTOR PLAN 1
Patient presented with tachypnea and SPo2 92% now requiring supplemental O2 to maintain O2 sat > 93% in setting of three day hx of RICHMOND. PE with no wheezing, Labs notable for lymphopenia (0.62), elevated inflammatory markers: CRP 8.73, ferritin 560, elevated D-dimer 411. CXR as above with questionable hazy bilateral opacifications. COVID PCR +. Patient with respiratory failure likely due to covid, low suspicion for asthma exacerbation as patient without wheezing, and symptoms not improved with albuterol.  - Maintain O2 >92%, supplemental oxygen as needed  - MDI as needed given + covid status  - see management below. Patient presented with tachypnea and SPo2 92% now requiring supplemental O2 to maintain O2 sat > 93% in setting of three day hx of RICHMOND. PE with no wheezing, Labs notable for lymphopenia (0.62), elevated inflammatory markers: CRP 8.73, ferritin 560, elevated D-dimer 411. CXR as above with questionable hazy bilateral opacifications. COVID PCR +. Other etiologies in include PE and wells score 1.5, with elevated d-dimer (likely elevated secondary to covid) Patient with respiratory failure likely due to covid, low suspicion for asthma exacerbation as patient without wheezing, and symptoms not improved with albuterol.  - Maintain O2 >92%, supplemental oxygen as needed  - MDI as needed given + covid status  - see management below.  - if clinically warrented Patient presented with tachypnea and SPo2 92% now requiring supplemental O2 to maintain O2 sat > 93% in setting of three day hx of RICHMOND. PE with no wheezing, Labs notable for lymphopenia (0.62), elevated inflammatory markers: CRP 8.73, ferritin 560, elevated D-dimer 411. CXR as above with questionable hazy bilateral opacifications. COVID PCR +. Other etiologies in include PE and wells score 1.5, with elevated d-dimer (likely elevated secondary to covid) Patient with respiratory failure likely due to covid, low suspicion for asthma exacerbation as patient without wheezing, and symptoms not improved with albuterol.  - Maintain O2 >92%, supplemental oxygen as needed  - MDI as needed given + covid status  - see management below.  - if clinically warranted--> patient with worsening repsiratory status in addition to elevated bnp, and troponin -->get CT Pe

## 2020-03-30 NOTE — ED ADULT NURSE NOTE - OBJECTIVE STATEMENT
Pt presents short of breath especially while walking, pt states it feels like an asthma exacerbation but isnt improving with her resuce inhaler. Pt presents with dry hacking cough, no oral fever but tachycardic and hot to touch, Pt declines rectal temp. PT reports hx of asthma and left breast CA in 2015 treated with lympmnodectomy chemo and radiation.

## 2020-03-30 NOTE — ED PROVIDER NOTE - CLINICAL SUMMARY MEDICAL DECISION MAKING FREE TEXT BOX
65F PMH asthma (never intubated), breast ca (s/p lumpectomy/chemo/radiation 2015) p/w SOB. Pt w/ 2-3d of worsening SOB, feels like her asthma, no relief w/ albuterol, worse w/ walking. Also w/ cough. + w/ URI symptoms. Pt had L medial breast pain/lump for which her breast doctor started her on abctrim 3d ago, also followed up w/ breast doctor today. +Slight diarrhea and decreased appetite. Came to Ed for worsening SOB. Tachycardic, satting low 90s and tachypneic w/ ambulation - improved to 95% w/ rest, 99 oral temp, other vitals wnl. Exam as above. Refusing rectal temp.  ddx: Sepsis. Likely URI/corona.   COVID labs w/u. CXR. Tylenol. Holding IVF given ?corona.   Reassess.

## 2020-03-30 NOTE — H&P ADULT - NSICDXPASTMEDICALHX_GEN_ALL_CORE_FT
PAST MEDICAL HISTORY:  Asthma     Breast cancer left    HTN (hypertension)     OA (osteoarthritis)     Obesity

## 2020-03-30 NOTE — H&P ADULT - PROBLEM SELECTOR PLAN 3
Patient with SOB x3 days with labs notable for lymphopenia (0.62), elevated inflammatory markers: CRP 8.73, ferritin 560, elevated D-dimer 411. CXR as above with questionable hazy bilateral opacifications. + Covid PCR  - covid lab bundle  - azithro/hydroxychloraquine due to O2 saturation <94%  - ascorbic acid 1500mg q6hr, Thiamine 200mg BID  - Tylenol prn for fever  - MDI for sob/wheezing  - caution with IVF  - trend daily inflammatory markers Patient with SOB x3 days with labs notable for lymphopenia (0.62), elevated inflammatory markers: CRP 8.73, ferritin 560, elevated D-dimer 411. CXR as above with questionable hazy bilateral opacifications. + Covid PCR  - covid lab bundle  - will hold azithro/hydroxychloraquine due to QTc 470, although needs due to O2 saturation <94%. Recheck EKG prior to initiating.   - ascorbic acid 1500mg q6hr, Thiamine 200mg BID  - Tylenol prn for fever  - MDI for sob/wheezing  - caution with IVF  - trend daily inflammatory markers

## 2020-03-30 NOTE — ED PROVIDER NOTE - OBJECTIVE STATEMENT
65F PMH asthma (never intubated), breast ca (s/p lumpectomy/chemo/radiation 2015) p/w SOB. Pt w/ 2-3d of worsening SOB, feels like her asthma, no relief w/ albuterol, worse w/ walking. Also w/ cough. + w/ URI symptoms. Pt had L medial breast pain/lump for which her breast doctor started her on abctrim 3d ago, also followed up w/ breast doctor today. +Slight diarrhea and decreased appetite. Came to Ed for worsening SOB. Denies HA, f/c, body aches, rhinorrhea, sore throat, CP, NV, urinary complaints, focal weakness/numbness, rashes, black/bloody stool, recent travel, known exposure to +COVID.

## 2020-03-30 NOTE — H&P ADULT - PROBLEM SELECTOR PLAN 6
Long standing hx of asthma (well controlled ICS + LABA and STAN PRN) last exacerbation in fall, never intubated. No wheezing on exam currently.   - c/w therapeutic interchange of advair diskus  - c/w Albuterol MDI PRN  - no steroids for now

## 2020-03-30 NOTE — H&P ADULT - PROBLEM SELECTOR PLAN 4
Patient with recent hx of L medial breast abscess completed x3 days of bactrim now s/p I and D with her doctor this am and improvement in pain.  - complete course bactrim x7 days Patient with recent hx of L medial breast abscess completed x3 days of bactrim now s/p I and D with her doctor this am and improvement in pain.  - complete course doxycycline for MRSA coverage

## 2020-03-30 NOTE — H&P ADULT - PROBLEM SELECTOR PLAN 2
2/4 SIRS criteria POA with + covid PCR. Patiently likely septic due to covid although with recent breast abscess s/p I and D this am and now on bactrim for three days. Low suspicion for bacterial PNA as patient with productive cough milan/redis colored sputum, procal 0.11 and no lobar consolidation on CXR. Patient without GI sxs, rashes or dysuria, low suspicion for other sources of infection.   - f/u blood cultures  - will continue bactrim for total 7 day course, no broad spectrum abx for now   - management of covid as below 2/4 SIRS criteria POA with + covid PCR. Patiently likely septic due to covid although with recent breast abscess s/p I and D this am and now on bactrim for three days. Low suspicion for bacterial PNA as patient with productive cough milan/redish colored sputum, procal 0.11 and no lobar consolidation on CXR. Patient without GI sxs, rashes or dysuria, low suspicion for other sources of infection.   - f/u blood cultures  - will continue bactrim for total 7 day course, no broad spectrum abx for now   - management of covid as below 2/4 SIRS criteria POA with + covid PCR. Patiently likely septic due to covid although with recent breast abscess s/p I and D this am and now on bactrim for three days. Low suspicion for bacterial PNA as patient with productive cough milan/redish colored sputum, procal 0.11 and no lobar consolidation on CXR. Patient without GI sxs, rashes or dysuria, low suspicion for other sources of infection.   - f/u blood cultures  - will continue doxycycline for total 7 day course, no broad spectrum abx for now   - management of covid as below

## 2020-03-30 NOTE — H&P ADULT - ASSESSMENT
65F with hx of asthma (well controlled on LABA + ICS, never intubated), breast cancer s/p chemo/radiation/lumpectomy 2015 now presenting with x3 day history of SOB. Admitted for covid management and observation.

## 2020-03-30 NOTE — H&P ADULT - PROBLEM SELECTOR PLAN 5
Patient presenting with x3 day history of SOB, low suspicion for cardiac etiology not fluid overloaded on exam, no ischemic changes on EKG. Patient presenting with x3 day history of SOB, low suspicion for cardiac etiology not fluid overloaded on exam, no ischemic changes on EKG. No anemia. SOB likely pulmonary in etiology due to covid.

## 2020-03-31 LAB
ALBUMIN SERPL ELPH-MCNC: 3.3 G/DL — SIGNIFICANT CHANGE UP (ref 3.3–5)
ALP SERPL-CCNC: 89 U/L — SIGNIFICANT CHANGE UP (ref 40–120)
ALT FLD-CCNC: 13 U/L — SIGNIFICANT CHANGE UP (ref 10–45)
ANION GAP SERPL CALC-SCNC: 13 MMOL/L — SIGNIFICANT CHANGE UP (ref 5–17)
AST SERPL-CCNC: 69 U/L — HIGH (ref 10–40)
BASOPHILS # BLD AUTO: 0 K/UL — SIGNIFICANT CHANGE UP (ref 0–0.2)
BASOPHILS NFR BLD AUTO: 0 % — SIGNIFICANT CHANGE UP (ref 0–2)
BILIRUB SERPL-MCNC: 0.8 MG/DL — SIGNIFICANT CHANGE UP (ref 0.2–1.2)
BUN SERPL-MCNC: 11 MG/DL — SIGNIFICANT CHANGE UP (ref 7–23)
CALCIUM SERPL-MCNC: 8.9 MG/DL — SIGNIFICANT CHANGE UP (ref 8.4–10.5)
CHLORIDE SERPL-SCNC: 103 MMOL/L — SIGNIFICANT CHANGE UP (ref 96–108)
CO2 SERPL-SCNC: 22 MMOL/L — SIGNIFICANT CHANGE UP (ref 22–31)
CREAT SERPL-MCNC: 0.87 MG/DL — SIGNIFICANT CHANGE UP (ref 0.5–1.3)
CRP SERPL-MCNC: 11.4 MG/DL — HIGH (ref 0–0.4)
EOSINOPHIL # BLD AUTO: 0 K/UL — SIGNIFICANT CHANGE UP (ref 0–0.5)
EOSINOPHIL NFR BLD AUTO: 0 % — SIGNIFICANT CHANGE UP (ref 0–6)
FERRITIN SERPL-MCNC: 624 NG/ML — HIGH (ref 15–150)
GLUCOSE SERPL-MCNC: 115 MG/DL — HIGH (ref 70–99)
HCT VFR BLD CALC: 38.8 % — SIGNIFICANT CHANGE UP (ref 34.5–45)
HCV AB S/CO SERPL IA: 0.08 S/CO — SIGNIFICANT CHANGE UP
HCV AB SERPL-IMP: SIGNIFICANT CHANGE UP
HGB BLD-MCNC: 12.4 G/DL — SIGNIFICANT CHANGE UP (ref 11.5–15.5)
LYMPHOCYTES # BLD AUTO: 0.38 K/UL — LOW (ref 1–3.3)
LYMPHOCYTES # BLD AUTO: 8.7 % — LOW (ref 13–44)
MAGNESIUM SERPL-MCNC: 2.4 MG/DL — SIGNIFICANT CHANGE UP (ref 1.6–2.6)
MCHC RBC-ENTMCNC: 28.6 PG — SIGNIFICANT CHANGE UP (ref 27–34)
MCHC RBC-ENTMCNC: 32 GM/DL — SIGNIFICANT CHANGE UP (ref 32–36)
MCV RBC AUTO: 89.6 FL — SIGNIFICANT CHANGE UP (ref 80–100)
MONOCYTES # BLD AUTO: 0.15 K/UL — SIGNIFICANT CHANGE UP (ref 0–0.9)
MONOCYTES NFR BLD AUTO: 3.5 % — SIGNIFICANT CHANGE UP (ref 2–14)
NEUTROPHILS # BLD AUTO: 3.87 K/UL — SIGNIFICANT CHANGE UP (ref 1.8–7.4)
NEUTROPHILS NFR BLD AUTO: 81.7 % — HIGH (ref 43–77)
PHOSPHATE SERPL-MCNC: 3 MG/DL — SIGNIFICANT CHANGE UP (ref 2.5–4.5)
PLATELET # BLD AUTO: 182 K/UL — SIGNIFICANT CHANGE UP (ref 150–400)
POTASSIUM SERPL-MCNC: 4.1 MMOL/L — SIGNIFICANT CHANGE UP (ref 3.5–5.3)
POTASSIUM SERPL-SCNC: 4.1 MMOL/L — SIGNIFICANT CHANGE UP (ref 3.5–5.3)
PROT SERPL-MCNC: 7.1 G/DL — SIGNIFICANT CHANGE UP (ref 6–8.3)
RBC # BLD: 4.33 M/UL — SIGNIFICANT CHANGE UP (ref 3.8–5.2)
RBC # FLD: 13.2 % — SIGNIFICANT CHANGE UP (ref 10.3–14.5)
SODIUM SERPL-SCNC: 138 MMOL/L — SIGNIFICANT CHANGE UP (ref 135–145)
WBC # BLD: 4.41 K/UL — SIGNIFICANT CHANGE UP (ref 3.8–10.5)
WBC # FLD AUTO: 4.41 K/UL — SIGNIFICANT CHANGE UP (ref 3.8–10.5)

## 2020-03-31 PROCEDURE — 93010 ELECTROCARDIOGRAM REPORT: CPT | Mod: 76

## 2020-03-31 PROCEDURE — 99233 SBSQ HOSP IP/OBS HIGH 50: CPT | Mod: GC

## 2020-03-31 PROCEDURE — 93010 ELECTROCARDIOGRAM REPORT: CPT | Mod: 77

## 2020-03-31 RX ORDER — ASCORBIC ACID 60 MG
1500 TABLET,CHEWABLE ORAL
Refills: 0 | Status: DISCONTINUED | OUTPATIENT
Start: 2020-03-31 | End: 2020-03-31

## 2020-03-31 RX ORDER — DEXTROSE 50 % IN WATER 50 %
25 SYRINGE (ML) INTRAVENOUS ONCE
Refills: 0 | Status: DISCONTINUED | OUTPATIENT
Start: 2020-03-31 | End: 2020-03-31

## 2020-03-31 RX ORDER — HYDROXYCHLOROQUINE SULFATE 200 MG
TABLET ORAL
Refills: 0 | Status: DISCONTINUED | OUTPATIENT
Start: 2020-04-01 | End: 2020-04-02

## 2020-03-31 RX ORDER — ALBUTEROL 90 UG/1
2 AEROSOL, METERED ORAL EVERY 6 HOURS
Refills: 0 | Status: DISCONTINUED | OUTPATIENT
Start: 2020-03-31 | End: 2020-04-02

## 2020-03-31 RX ORDER — THIAMINE MONONITRATE (VIT B1) 100 MG
200 TABLET ORAL
Refills: 0 | Status: DISCONTINUED | OUTPATIENT
Start: 2020-03-31 | End: 2020-03-31

## 2020-03-31 RX ORDER — ENOXAPARIN SODIUM 100 MG/ML
40 INJECTION SUBCUTANEOUS EVERY 24 HOURS
Refills: 0 | Status: DISCONTINUED | OUTPATIENT
Start: 2020-03-31 | End: 2020-04-02

## 2020-03-31 RX ORDER — SODIUM CHLORIDE 9 MG/ML
1000 INJECTION, SOLUTION INTRAVENOUS
Refills: 0 | Status: DISCONTINUED | OUTPATIENT
Start: 2020-03-31 | End: 2020-03-31

## 2020-03-31 RX ORDER — ASCORBIC ACID 60 MG
1500 TABLET,CHEWABLE ORAL
Refills: 0 | Status: DISCONTINUED | OUTPATIENT
Start: 2020-03-31 | End: 2020-04-02

## 2020-03-31 RX ORDER — DEXTROSE 50 % IN WATER 50 %
12.5 SYRINGE (ML) INTRAVENOUS ONCE
Refills: 0 | Status: DISCONTINUED | OUTPATIENT
Start: 2020-03-31 | End: 2020-03-31

## 2020-03-31 RX ORDER — GLUCAGON INJECTION, SOLUTION 0.5 MG/.1ML
1 INJECTION, SOLUTION SUBCUTANEOUS ONCE
Refills: 0 | Status: DISCONTINUED | OUTPATIENT
Start: 2020-03-31 | End: 2020-03-31

## 2020-03-31 RX ORDER — DEXTROSE 50 % IN WATER 50 %
15 SYRINGE (ML) INTRAVENOUS ONCE
Refills: 0 | Status: DISCONTINUED | OUTPATIENT
Start: 2020-03-31 | End: 2020-03-31

## 2020-03-31 RX ORDER — AZITHROMYCIN 500 MG/1
250 TABLET, FILM COATED ORAL DAILY
Refills: 0 | Status: DISCONTINUED | OUTPATIENT
Start: 2020-03-31 | End: 2020-04-02

## 2020-03-31 RX ORDER — HYDROXYCHLOROQUINE SULFATE 200 MG
200 TABLET ORAL EVERY 12 HOURS
Refills: 0 | Status: DISCONTINUED | OUTPATIENT
Start: 2020-04-01 | End: 2020-04-02

## 2020-03-31 RX ORDER — THIAMINE MONONITRATE (VIT B1) 100 MG
200 TABLET ORAL
Refills: 0 | Status: DISCONTINUED | OUTPATIENT
Start: 2020-03-31 | End: 2020-04-02

## 2020-03-31 RX ORDER — INSULIN LISPRO 100/ML
VIAL (ML) SUBCUTANEOUS
Refills: 0 | Status: DISCONTINUED | OUTPATIENT
Start: 2020-03-31 | End: 2020-03-31

## 2020-03-31 RX ORDER — HYDROXYCHLOROQUINE SULFATE 200 MG
400 TABLET ORAL EVERY 12 HOURS
Refills: 0 | Status: COMPLETED | OUTPATIENT
Start: 2020-03-31 | End: 2020-04-01

## 2020-03-31 RX ADMIN — Medication 1500 MILLIGRAM(S): at 14:56

## 2020-03-31 RX ADMIN — Medication 100 MILLIGRAM(S): at 23:01

## 2020-03-31 RX ADMIN — Medication 100 MILLIGRAM(S): at 11:33

## 2020-03-31 RX ADMIN — LETROZOLE 2.5 MILLIGRAM(S): 2.5 TABLET, FILM COATED ORAL at 11:33

## 2020-03-31 RX ADMIN — AMLODIPINE BESYLATE 10 MILLIGRAM(S): 2.5 TABLET ORAL at 06:00

## 2020-03-31 RX ADMIN — BUDESONIDE AND FORMOTEROL FUMARATE DIHYDRATE 2 PUFF(S): 160; 4.5 AEROSOL RESPIRATORY (INHALATION) at 06:00

## 2020-03-31 RX ADMIN — Medication 400 MILLIGRAM(S): at 14:57

## 2020-03-31 RX ADMIN — Medication 650 MILLIGRAM(S): at 23:00

## 2020-03-31 RX ADMIN — Medication 40 MILLIGRAM(S): at 14:57

## 2020-03-31 RX ADMIN — AZITHROMYCIN 250 MILLIGRAM(S): 500 TABLET, FILM COATED ORAL at 17:41

## 2020-03-31 RX ADMIN — ALBUTEROL 2 PUFF(S): 90 AEROSOL, METERED ORAL at 23:00

## 2020-03-31 RX ADMIN — Medication 650 MILLIGRAM(S): at 14:57

## 2020-03-31 RX ADMIN — ALBUTEROL 2 PUFF(S): 90 AEROSOL, METERED ORAL at 14:56

## 2020-03-31 RX ADMIN — Medication 1500 MILLIGRAM(S): at 23:00

## 2020-03-31 RX ADMIN — Medication 650 MILLIGRAM(S): at 06:00

## 2020-03-31 RX ADMIN — BUDESONIDE AND FORMOTEROL FUMARATE DIHYDRATE 2 PUFF(S): 160; 4.5 AEROSOL RESPIRATORY (INHALATION) at 17:42

## 2020-03-31 RX ADMIN — Medication 650 MILLIGRAM(S): at 11:32

## 2020-03-31 RX ADMIN — ENOXAPARIN SODIUM 40 MILLIGRAM(S): 100 INJECTION SUBCUTANEOUS at 17:41

## 2020-03-31 RX ADMIN — Medication 1500 MILLIGRAM(S): at 17:41

## 2020-03-31 RX ADMIN — Medication 200 MILLIGRAM(S): at 14:57

## 2020-03-31 NOTE — PROGRESS NOTE ADULT - PROBLEM SELECTOR PLAN 1
RESOLVED  Patient presented with baseline O2 @ low 90s and required nasal cannula  - This morning no longer requiring NC, saturating around 94-95% on RA Patient presented with baseline O2 @ low 90s and required nasal cannula  - desaturation in afternoon, supplemental O2

## 2020-03-31 NOTE — PROGRESS NOTE ADULT - SUBJECTIVE AND OBJECTIVE BOX
Interval / Overnight: LAURA    Subjective: Ms. Rowley tells me she is feeling overall well. She is still with a mild cough and chest tightness. She denies feeling SOB. Her breast is not currently bothering her.     VITAL SIGNS:  Vital Signs Last 24 Hrs  T(C): 36.5 (31 Mar 2020 10:37), Max: 38.1 (30 Mar 2020 21:38)  T(F): 97.7 (31 Mar 2020 10:37), Max: 100.5 (30 Mar 2020 21:38)  HR: 93 (31 Mar 2020 10:37) (93 - 115)  BP: 121/79 (31 Mar 2020 10:37) (119/76 - 150/84)  BP(mean): 93 (31 Mar 2020 10:37) (93 - 93)  RR: 22 (31 Mar 2020 10:37) (18 - 22)  SpO2: 93% (31 Mar 2020 10:37) (93% - 100%)    PHYSICAL EXAM:    Gen: Non ill appearing, middle aged woman.   HNT: NCAT, normal conjctva, MMM w/ clear OP  Neck: No LAD  Card: s1s2 RRR  pulm: clear lungs BL, non-tachyapnea   Breast exam: left breast is with incision w/o purulent drainage  Exts: WWP, cap refill normal     MEDICATIONS:  MEDICATIONS  (STANDING):  acetaminophen   Tablet .. 650 milliGRAM(s) Oral every 6 hours  amLODIPine   Tablet 10 milliGRAM(s) Oral daily  budesonide 160 MICROgram(s)/formoterol 4.5 MICROgram(s) Inhaler 2 Puff(s) Inhalation two times a day  doxycycline hyclate Capsule 100 milliGRAM(s) Oral every 12 hours  influenza   Vaccine 0.5 milliLiter(s) IntraMuscular once  letrozole 2.5 milliGRAM(s) Oral daily    MEDICATIONS  (PRN):  ALBUTerol    90 MICROgram(s) HFA Inhaler 1 Puff(s) Inhalation every 6 hours PRN Shortness of Breath and/or Wheezing  ALBUTerol    90 MICROgram(s) HFA Inhaler 2 Puff(s) Inhalation every 4 hours PRN Shortness of Breath and/or Wheezing  guaiFENesin   Syrup  (Sugar-Free) 200 milliGRAM(s) Oral every 6 hours PRN Cough      ALLERGIES:  Allergies    latex (Rash)  No Known Drug Allergies  shellfish (Unknown)    Intolerances        LABS:                        12.1   3.70  )-----------( 168      ( 30 Mar 2020 13:34 )             36.9     03-30    136  |  102  |  10  ----------------------------<  99  4.0   |  18<L>  |  1.03    Ca    8.7      30 Mar 2020 13:26  Mg     2.1     03-30    TPro  7.0  /  Alb  3.6  /  TBili  0.7  /  DBili  x   /  AST  62<H>  /  ALT  13  /  AlkPhos  92  03-30    PT/INR - ( 30 Mar 2020 13:26 )   PT: 12.2 sec;   INR: 1.07          PTT - ( 30 Mar 2020 13:26 )  PTT:29.5 sec    CAPILLARY BLOOD GLUCOSE          RADIOLOGY & ADDITIONAL TESTS: Reviewed.

## 2020-04-01 ENCOUNTER — TRANSCRIPTION ENCOUNTER (OUTPATIENT)
Age: 65
End: 2020-04-01

## 2020-04-01 LAB
-  AMPICILLIN: SIGNIFICANT CHANGE UP
-  CLINDAMYCIN: SIGNIFICANT CHANGE UP
-  ERYTHROMYCIN: SIGNIFICANT CHANGE UP
-  LEVOFLOXACIN: SIGNIFICANT CHANGE UP
-  PENICILLIN: SIGNIFICANT CHANGE UP
-  VANCOMYCIN: SIGNIFICANT CHANGE UP
ALBUMIN SERPL ELPH-MCNC: 3.5 G/DL — SIGNIFICANT CHANGE UP (ref 3.3–5)
ALP SERPL-CCNC: 90 U/L — SIGNIFICANT CHANGE UP (ref 40–120)
ALT FLD-CCNC: 11 U/L — SIGNIFICANT CHANGE UP (ref 10–45)
ANION GAP SERPL CALC-SCNC: 13 MMOL/L — SIGNIFICANT CHANGE UP (ref 5–17)
AST SERPL-CCNC: 53 U/L — HIGH (ref 10–40)
BILIRUB SERPL-MCNC: 0.7 MG/DL — SIGNIFICANT CHANGE UP (ref 0.2–1.2)
BUN SERPL-MCNC: 20 MG/DL — SIGNIFICANT CHANGE UP (ref 7–23)
CALCIUM SERPL-MCNC: 9.3 MG/DL — SIGNIFICANT CHANGE UP (ref 8.4–10.5)
CHLORIDE SERPL-SCNC: 102 MMOL/L — SIGNIFICANT CHANGE UP (ref 96–108)
CO2 SERPL-SCNC: 19 MMOL/L — LOW (ref 22–31)
CREAT SERPL-MCNC: 0.95 MG/DL — SIGNIFICANT CHANGE UP (ref 0.5–1.3)
CRP SERPL-MCNC: 7.53 MG/DL — HIGH (ref 0–0.4)
CULTURE RESULTS: SIGNIFICANT CHANGE UP
D DIMER BLD IA.RAPID-MCNC: 369 NG/ML DDU — HIGH
FERRITIN SERPL-MCNC: 734 NG/ML — HIGH (ref 15–150)
G6PD RBC-CCNC: 12.3 U/G HGB — SIGNIFICANT CHANGE UP (ref 7–20.5)
GLUCOSE SERPL-MCNC: 145 MG/DL — HIGH (ref 70–99)
HCT VFR BLD CALC: 38 % — SIGNIFICANT CHANGE UP (ref 34.5–45)
HGB BLD-MCNC: 12.5 G/DL — SIGNIFICANT CHANGE UP (ref 11.5–15.5)
MAGNESIUM SERPL-MCNC: 2.5 MG/DL — SIGNIFICANT CHANGE UP (ref 1.6–2.6)
MCHC RBC-ENTMCNC: 28.9 PG — SIGNIFICANT CHANGE UP (ref 27–34)
MCHC RBC-ENTMCNC: 32.9 GM/DL — SIGNIFICANT CHANGE UP (ref 32–36)
MCV RBC AUTO: 88 FL — SIGNIFICANT CHANGE UP (ref 80–100)
METHOD TYPE: SIGNIFICANT CHANGE UP
NRBC # BLD: 0 /100 WBCS — SIGNIFICANT CHANGE UP (ref 0–0)
ORGANISM # SPEC MICROSCOPIC CNT: SIGNIFICANT CHANGE UP
ORGANISM # SPEC MICROSCOPIC CNT: SIGNIFICANT CHANGE UP
PHOSPHATE SERPL-MCNC: 3.5 MG/DL — SIGNIFICANT CHANGE UP (ref 2.5–4.5)
PLATELET # BLD AUTO: 251 K/UL — SIGNIFICANT CHANGE UP (ref 150–400)
POTASSIUM SERPL-MCNC: 4.1 MMOL/L — SIGNIFICANT CHANGE UP (ref 3.5–5.3)
POTASSIUM SERPL-SCNC: 4.1 MMOL/L — SIGNIFICANT CHANGE UP (ref 3.5–5.3)
PROT SERPL-MCNC: 7.3 G/DL — SIGNIFICANT CHANGE UP (ref 6–8.3)
RBC # BLD: 4.32 M/UL — SIGNIFICANT CHANGE UP (ref 3.8–5.2)
RBC # FLD: 12.9 % — SIGNIFICANT CHANGE UP (ref 10.3–14.5)
SODIUM SERPL-SCNC: 134 MMOL/L — LOW (ref 135–145)
SPECIMEN SOURCE: SIGNIFICANT CHANGE UP
WBC # BLD: 3.27 K/UL — LOW (ref 3.8–10.5)
WBC # FLD AUTO: 3.27 K/UL — LOW (ref 3.8–10.5)

## 2020-04-01 PROCEDURE — 99233 SBSQ HOSP IP/OBS HIGH 50: CPT | Mod: GC

## 2020-04-01 RX ADMIN — AMLODIPINE BESYLATE 10 MILLIGRAM(S): 2.5 TABLET ORAL at 04:50

## 2020-04-01 RX ADMIN — Medication 200 MILLIGRAM(S): at 16:25

## 2020-04-01 RX ADMIN — ALBUTEROL 2 PUFF(S): 90 AEROSOL, METERED ORAL at 04:49

## 2020-04-01 RX ADMIN — Medication 650 MILLIGRAM(S): at 04:50

## 2020-04-01 RX ADMIN — BUDESONIDE AND FORMOTEROL FUMARATE DIHYDRATE 2 PUFF(S): 160; 4.5 AEROSOL RESPIRATORY (INHALATION) at 16:29

## 2020-04-01 RX ADMIN — Medication 200 MILLIGRAM(S): at 04:50

## 2020-04-01 RX ADMIN — Medication 1500 MILLIGRAM(S): at 16:28

## 2020-04-01 RX ADMIN — Medication 100 MILLIGRAM(S): at 22:36

## 2020-04-01 RX ADMIN — Medication 650 MILLIGRAM(S): at 16:24

## 2020-04-01 RX ADMIN — ALBUTEROL 2 PUFF(S): 90 AEROSOL, METERED ORAL at 16:24

## 2020-04-01 RX ADMIN — Medication 1500 MILLIGRAM(S): at 23:23

## 2020-04-01 RX ADMIN — AZITHROMYCIN 250 MILLIGRAM(S): 500 TABLET, FILM COATED ORAL at 10:30

## 2020-04-01 RX ADMIN — Medication 650 MILLIGRAM(S): at 22:36

## 2020-04-01 RX ADMIN — Medication 1500 MILLIGRAM(S): at 04:51

## 2020-04-01 RX ADMIN — BUDESONIDE AND FORMOTEROL FUMARATE DIHYDRATE 2 PUFF(S): 160; 4.5 AEROSOL RESPIRATORY (INHALATION) at 04:51

## 2020-04-01 RX ADMIN — ALBUTEROL 2 PUFF(S): 90 AEROSOL, METERED ORAL at 10:33

## 2020-04-01 RX ADMIN — Medication 400 MILLIGRAM(S): at 04:49

## 2020-04-01 RX ADMIN — Medication 200 MILLIGRAM(S): at 16:27

## 2020-04-01 RX ADMIN — Medication 1500 MILLIGRAM(S): at 10:31

## 2020-04-01 RX ADMIN — Medication 100 MILLIGRAM(S): at 10:32

## 2020-04-01 RX ADMIN — LETROZOLE 2.5 MILLIGRAM(S): 2.5 TABLET, FILM COATED ORAL at 10:33

## 2020-04-01 RX ADMIN — Medication 40 MILLIGRAM(S): at 04:50

## 2020-04-01 RX ADMIN — ALBUTEROL 2 PUFF(S): 90 AEROSOL, METERED ORAL at 22:36

## 2020-04-01 RX ADMIN — Medication 650 MILLIGRAM(S): at 10:32

## 2020-04-01 RX ADMIN — ENOXAPARIN SODIUM 40 MILLIGRAM(S): 100 INJECTION SUBCUTANEOUS at 16:25

## 2020-04-01 NOTE — PROGRESS NOTE ADULT - PROBLEM SELECTOR PLAN 1
Patient presented with baseline O2 @ low 90s and required nasal cannula  - Currently breathing comfortably on room air 95%

## 2020-04-01 NOTE — PROGRESS NOTE ADULT - PROBLEM SELECTOR PLAN 8
hx of breast cancer in 2015. S/p chemo/radiation/lumpectomy. now on lestrozole.  - c/w lestrozole home dose
hx of breast cancer in 2015. S/p chemo/radiation/lumpectomy. now on lestrozole.  - c/w lestrozole home dose

## 2020-04-01 NOTE — PROGRESS NOTE ADULT - PROBLEM SELECTOR PLAN 3
Patient with SOB x3 days with labs notable for lymphopenia (0.62), elevated inflammatory markers: CRP 8.73, ferritin 560, elevated D-dimer 411. CXR as above with questionable hazy bilateral opacifications. + Covid PCR  - covid lab bundle  - ascorbic acid 1500mg q6hr, Thiamine 200mg BID  - Tylenol prn for fever  - MDI for sob/wheezing  - caution with IVF  - trend daily inflammatory markers
Patient with SOB x3 days with labs notable for lymphopenia (0.62), elevated inflammatory markers: CRP 8.73, ferritin 560, elevated D-dimer 411. CXR as above with questionable hazy bilateral opacifications. + Covid PCR  - covid lab bundle  - ascorbic acid 1500mg q6hr, Thiamine 200mg BID  - Tylenol prn for fever  - MDI for sob/wheezing  - caution with IVF  - trend daily inflammatory markers  - Rechecking EKG, but given not hypoxic -- won't require plaquenil / azithro

## 2020-04-01 NOTE — DISCHARGE NOTE PROVIDER - HOSPITAL COURSE
65F with hx of asthma (well controlled on LABA + ICS, never intubated), breast cancer s/p chemo/radiation/lumpectomy 2015 c/b breast abscess s/p I&D now presenting with x3 day history of SOB. Admitted for covid and breast abscess management         Problem List/Main Diagnoses (system-based):     #SARS-associated coronavirus infection.    SOB x3 days with labs notable for lymphopenia (0.62), elevated inflammatory markers: CRP 8.73, ferritin 560, elevated D-dimer 411. CXR as above with questionable hazy bilateral opacifications. + Covid PCR    - started on Plaquenil and azithromycin     -Initially requiring NC now saturating 95% on RA            #Breast Abscess    recent hx of L medial breast abscess completed x3 days of bactrim now s/p I and D    -c/w Doxycycline 100mg PO BID (last 4/3)         #Asthma     Long standing hx of asthma (well controlled ICS + LABA and STAN PRN) last exacerbation in fall, never intubated. No wheezing on exam              Inpatient treatment course:     New medications: Hypoxia from COVID requring NC -> RA. Started on Plaquenil and azithromycin , Vitamin C, and Thiamine for COVID. Doxycycline 100mg PO BID for breast abscess.     Labs to be followed outpatient: n/a    Exam to be followed outpatient: n/a

## 2020-04-01 NOTE — PROGRESS NOTE ADULT - SUBJECTIVE AND OBJECTIVE BOX
Interval / Overnight: LAURA overnight     Subjective: Ms. Rowley tells me she has a cough which is non-productive which is bothersome, but she doesn't feel short of breath. She denies feeling feverish. She is still having diarrhea.     VITAL SIGNS:  Vital Signs Last 24 Hrs  T(C): 36.5 (01 Apr 2020 12:33), Max: 36.5 (31 Mar 2020 23:30)  T(F): 97.7 (01 Apr 2020 12:33), Max: 97.7 (31 Mar 2020 23:30)  HR: 89 (01 Apr 2020 12:33) (88 - 99)  BP: 126/84 (01 Apr 2020 12:33) (117/74 - 149/81)  BP(mean): --  RR: 20 (01 Apr 2020 12:33) (20 - 22)  SpO2: 95% (01 Apr 2020 12:33) (92% - 99%)    PHYSICAL EXAM:    General: in NAD, lying comfortably in bed breathing easily  HNT: NCAT, normal conjunctiva, MMM  Neck; No LAD no accessory muscle use  Card: s1s2 RRR  Pulm: clear lungs, non tachyapnic   exts: wwp     MEDICATIONS:  MEDICATIONS  (STANDING):  acetaminophen   Tablet .. 650 milliGRAM(s) Oral every 6 hours  ALBUTerol    90 MICROgram(s) HFA Inhaler 2 Puff(s) Inhalation every 6 hours  amLODIPine   Tablet 10 milliGRAM(s) Oral daily  ascorbic acid 1500 milliGRAM(s) Oral four times a day  azithromycin   Tablet 250 milliGRAM(s) Oral daily  budesonide 160 MICROgram(s)/formoterol 4.5 MICROgram(s) Inhaler 2 Puff(s) Inhalation two times a day  doxycycline hyclate Capsule 100 milliGRAM(s) Oral every 12 hours  enoxaparin Injectable 40 milliGRAM(s) SubCutaneous every 24 hours  hydroxychloroquine   Oral   hydroxychloroquine 200 milliGRAM(s) Oral every 12 hours  influenza   Vaccine 0.5 milliLiter(s) IntraMuscular once  letrozole 2.5 milliGRAM(s) Oral daily  predniSONE   Tablet 40 milliGRAM(s) Oral daily  thiamine 200 milliGRAM(s) Oral <User Schedule>    MEDICATIONS  (PRN):  guaiFENesin   Syrup  (Sugar-Free) 200 milliGRAM(s) Oral every 6 hours PRN Cough      ALLERGIES:  Allergies    latex (Rash)  No Known Drug Allergies  shellfish (Unknown)    Intolerances        LABS:                        12.4   4.41  )-----------( 182      ( 31 Mar 2020 12:03 )             38.8     03-31    138  |  103  |  11  ----------------------------<  115<H>  4.1   |  22  |  0.87    Ca    8.9      31 Mar 2020 12:03  Phos  3.0     03-31  Mg     2.4     03-31    TPro  7.1  /  Alb  3.3  /  TBili  0.8  /  DBili  x   /  AST  69<H>  /  ALT  13  /  AlkPhos  89  03-31    PT/INR - ( 30 Mar 2020 13:26 )   PT: 12.2 sec;   INR: 1.07          PTT - ( 30 Mar 2020 13:26 )  PTT:29.5 sec    CAPILLARY BLOOD GLUCOSE          RADIOLOGY & ADDITIONAL TESTS: Reviewed.

## 2020-04-01 NOTE — PROGRESS NOTE ADULT - ATTENDING COMMENTS
Awaiting labs/biomarkers, still w/ SOB but appears comfortable, anxious to return home. Saturation improved.
Patient severely symptomatic w/ asthma exacerbation 2/2 covid, c/w covid tx and steroids, supplemental O2 as needed. Overall appears well, if off NC tomorrow will plan for d/c.

## 2020-04-01 NOTE — PROGRESS NOTE ADULT - PROBLEM SELECTOR PLAN 4
Patient with recent hx of L medial breast abscess completed x3 days of bactrim now s/p I and D with her doctor this am and improvement in pain.  - complete course doxycycline for MRSA coverage
Patient with recent hx of L medial breast abscess completed x3 days of bactrim now s/p I and D with her doctor this am and improvement in pain.  - complete course doxycycline for MRSA coverage

## 2020-04-01 NOTE — DISCHARGE NOTE PROVIDER - NSDCCPCAREPLAN_GEN_ALL_CORE_FT
PRINCIPAL DISCHARGE DIAGNOSIS  Diagnosis: SARS-associated coronavirus infection  Assessment and Plan of Treatment: There was concern that you may have the novel new coronavirus, also known as COVID-19. This test resulted from a nasal and oral (mouth) swab that was done earlier in your admission to [facility]. Your symptoms improved dramatically during your hospital stay. Should your test result be positive, the treatment is supportive care, which means: rest, hydration, and maintaining a good healthy diet. You may take tylenol if you experience any fevers and Robitussin for cough. If you start experiencing extreme shortness of breath, difficulty breathing resulting in the inability to even walk, please visit an urgent care. Please maintain a 2 week (14 day) quarantine in your apartment until a health care agent calls you with the test results. The department of health will followup with you via phone, please do not go to your PCP while in self quarantine. Wear a mask at all times should you have to be outdoors briefly - and avoid crowds, public spaces, and mass transit at all times during this quarantine. Continue to wash your hands frequently. Please see the supplemented written instructions for further use.      SECONDARY DISCHARGE DIAGNOSES  Diagnosis: Breast abscess  Assessment and Plan of Treatment: A breast abscess is a painful build-up of pus in the breast caused by an infection. Symptoms include a painful, red and warm breast  and/or a lump or swelling in your breast. You were initially taking antibiotics prior to your hospitalization and had seen your doctor who performed an incision and drainage of the abscess. Your antibiotics were changed to Doxycycline 100mg twice a day. Continue to take your antibiotics until the course is completed and follow up with your doctor to ensure proper healing and recovery.

## 2020-04-01 NOTE — PROGRESS NOTE ADULT - PROBLEM SELECTOR PLAN 2
2/4 SIRS criteria POA with + covid PCR. Patiently likely septic due to covid although with recent breast abscess s/p I and D  and now on bactrim for three days. Low suspicion for bacterial PNA as patient with BL infiltrates, COVID+ and procal 0.11    - blood cultures NGTD  - will continue doxycycline for total 7 day course, no broad spectrum abx for now   - management of covid as below
2/4 SIRS criteria POA with + covid PCR. Patiently likely septic due to covid although with recent breast abscess s/p I and D  and now on bactrim for three days. Low suspicion for bacterial PNA as patient with BL infiltrates, COVID+ and procal 0.11    - blood cultures NGTD  - will continue doxycycline for total 7 day course, no broad spectrum abx for now   - management of covid as below

## 2020-04-01 NOTE — DISCHARGE NOTE PROVIDER - NSDCMRMEDTOKEN_GEN_ALL_CORE_FT
Advair Diskus 250 mcg-50 mcg inhalation powder:   letrozole 2.5 mg oral tablet: 1 tab(s) orally once a day  Norvasc 10 mg oral tablet: 1 tab(s) orally once a day  ProAir HFA 90 mcg/inh inhalation aerosol: 2 puff(s) inhaled every 6 hours Advair Diskus 250 mcg-50 mcg inhalation powder:   Deltasone 20 mg oral tablet: 2 tab(s) orally once a day  letrozole 2.5 mg oral tablet: 1 tab(s) orally once a day  Monodox 100 mg oral capsule: 1 cap(s) orally every 12 hours  Norvasc 10 mg oral tablet: 1 tab(s) orally once a day  Plaquenil 200 mg oral tablet: 1 tab(s) orally 2 times a day   ProAir HFA 90 mcg/inh inhalation aerosol: 2 puff(s) inhaled every 6 hours  Zithromax 250 mg oral tablet: 1 tab(s) orally once a day

## 2020-04-01 NOTE — PROGRESS NOTE ADULT - PROBLEM SELECTOR PLAN 6
Long standing hx of asthma (well controlled ICS + LABA and STAN PRN) last exacerbation in fall, never intubated. No wheezing on exam currently.   - c/w therapeutic interchange of advair diskus  - c/w Albuterol MDI PRN  - no steroids for now
Long standing hx of asthma (well controlled ICS + LABA and STAN PRN) last exacerbation in fall, never intubated. No wheezing on exam currently.   - c/w therapeutic interchange of advair diskus  - c/w Albuterol MDI PRN  - no steroids for now

## 2020-04-01 NOTE — PROGRESS NOTE ADULT - PROBLEM SELECTOR PLAN 7
Discussed with patient: wishes to do whatever we have to keep patient alive.    FULL CODE
Discussed with patient: wishes to do whatever we have to keep patient alive.    FULL CODE

## 2020-04-02 ENCOUNTER — TRANSCRIPTION ENCOUNTER (OUTPATIENT)
Age: 65
End: 2020-04-02

## 2020-04-02 VITALS — HEART RATE: 94 BPM | OXYGEN SATURATION: 100 % | RESPIRATION RATE: 18 BRPM

## 2020-04-02 LAB
ALBUMIN SERPL ELPH-MCNC: 3.4 G/DL — SIGNIFICANT CHANGE UP (ref 3.3–5)
ALP SERPL-CCNC: 84 U/L — SIGNIFICANT CHANGE UP (ref 40–120)
ALT FLD-CCNC: 13 U/L — SIGNIFICANT CHANGE UP (ref 10–45)
ANION GAP SERPL CALC-SCNC: 13 MMOL/L — SIGNIFICANT CHANGE UP (ref 5–17)
AST SERPL-CCNC: 52 U/L — HIGH (ref 10–40)
BASOPHILS # BLD AUTO: 0 K/UL — SIGNIFICANT CHANGE UP (ref 0–0.2)
BASOPHILS NFR BLD AUTO: 0 % — SIGNIFICANT CHANGE UP (ref 0–2)
BILIRUB SERPL-MCNC: 0.6 MG/DL — SIGNIFICANT CHANGE UP (ref 0.2–1.2)
BUN SERPL-MCNC: 22 MG/DL — SIGNIFICANT CHANGE UP (ref 7–23)
CALCIUM SERPL-MCNC: 9.5 MG/DL — SIGNIFICANT CHANGE UP (ref 8.4–10.5)
CHLORIDE SERPL-SCNC: 106 MMOL/L — SIGNIFICANT CHANGE UP (ref 96–108)
CO2 SERPL-SCNC: 20 MMOL/L — LOW (ref 22–31)
CREAT SERPL-MCNC: 1.07 MG/DL — SIGNIFICANT CHANGE UP (ref 0.5–1.3)
CRP SERPL-MCNC: 3.58 MG/DL — HIGH (ref 0–0.4)
D DIMER BLD IA.RAPID-MCNC: 302 NG/ML DDU — HIGH
EOSINOPHIL # BLD AUTO: 0 K/UL — SIGNIFICANT CHANGE UP (ref 0–0.5)
EOSINOPHIL NFR BLD AUTO: 0 % — SIGNIFICANT CHANGE UP (ref 0–6)
FERRITIN SERPL-MCNC: 762 NG/ML — HIGH (ref 15–150)
GLUCOSE SERPL-MCNC: 104 MG/DL — HIGH (ref 70–99)
HCT VFR BLD CALC: 38.6 % — SIGNIFICANT CHANGE UP (ref 34.5–45)
HGB BLD-MCNC: 12.6 G/DL — SIGNIFICANT CHANGE UP (ref 11.5–15.5)
INTERFERON GAMMA, BLOOD: <5 PG/ML — SIGNIFICANT CHANGE UP
LYMPHOCYTES # BLD AUTO: 0.44 K/UL — LOW (ref 1–3.3)
LYMPHOCYTES # BLD AUTO: 11.4 % — LOW (ref 13–44)
MAGNESIUM SERPL-MCNC: 2.4 MG/DL — SIGNIFICANT CHANGE UP (ref 1.6–2.6)
MCHC RBC-ENTMCNC: 28.9 PG — SIGNIFICANT CHANGE UP (ref 27–34)
MCHC RBC-ENTMCNC: 32.6 GM/DL — SIGNIFICANT CHANGE UP (ref 32–36)
MCV RBC AUTO: 88.5 FL — SIGNIFICANT CHANGE UP (ref 80–100)
MONOCYTES # BLD AUTO: 0.21 K/UL — SIGNIFICANT CHANGE UP (ref 0–0.9)
MONOCYTES NFR BLD AUTO: 5.3 % — SIGNIFICANT CHANGE UP (ref 2–14)
NEUTROPHILS # BLD AUTO: 3.1 K/UL — SIGNIFICANT CHANGE UP (ref 1.8–7.4)
NEUTROPHILS NFR BLD AUTO: 77.2 % — HIGH (ref 43–77)
PHOSPHATE SERPL-MCNC: 3.9 MG/DL — SIGNIFICANT CHANGE UP (ref 2.5–4.5)
PLATELET # BLD AUTO: 259 K/UL — SIGNIFICANT CHANGE UP (ref 150–400)
POTASSIUM SERPL-MCNC: 4 MMOL/L — SIGNIFICANT CHANGE UP (ref 3.5–5.3)
POTASSIUM SERPL-SCNC: 4 MMOL/L — SIGNIFICANT CHANGE UP (ref 3.5–5.3)
PROT SERPL-MCNC: 6.8 G/DL — SIGNIFICANT CHANGE UP (ref 6–8.3)
RBC # BLD: 4.36 M/UL — SIGNIFICANT CHANGE UP (ref 3.8–5.2)
RBC # FLD: 12.9 % — SIGNIFICANT CHANGE UP (ref 10.3–14.5)
SODIUM SERPL-SCNC: 139 MMOL/L — SIGNIFICANT CHANGE UP (ref 135–145)
WBC # BLD: 3.89 K/UL — SIGNIFICANT CHANGE UP (ref 3.8–10.5)
WBC # FLD AUTO: 3.89 K/UL — SIGNIFICANT CHANGE UP (ref 3.8–10.5)

## 2020-04-02 PROCEDURE — 87040 BLOOD CULTURE FOR BACTERIA: CPT

## 2020-04-02 PROCEDURE — 83605 ASSAY OF LACTIC ACID: CPT

## 2020-04-02 PROCEDURE — 87184 SC STD DISK METHOD PER PLATE: CPT

## 2020-04-02 PROCEDURE — 36415 COLL VENOUS BLD VENIPUNCTURE: CPT

## 2020-04-02 PROCEDURE — 87075 CULTR BACTERIA EXCEPT BLOOD: CPT

## 2020-04-02 PROCEDURE — 85610 PROTHROMBIN TIME: CPT

## 2020-04-02 PROCEDURE — 99239 HOSP IP/OBS DSCHRG MGMT >30: CPT | Mod: GC

## 2020-04-02 PROCEDURE — 87205 SMEAR GRAM STAIN: CPT

## 2020-04-02 PROCEDURE — 93005 ELECTROCARDIOGRAM TRACING: CPT

## 2020-04-02 PROCEDURE — 99285 EMERGENCY DEPT VISIT HI MDM: CPT

## 2020-04-02 PROCEDURE — 84100 ASSAY OF PHOSPHORUS: CPT

## 2020-04-02 PROCEDURE — 83735 ASSAY OF MAGNESIUM: CPT

## 2020-04-02 PROCEDURE — 82728 ASSAY OF FERRITIN: CPT

## 2020-04-02 PROCEDURE — 86140 C-REACTIVE PROTEIN: CPT

## 2020-04-02 PROCEDURE — 85027 COMPLETE CBC AUTOMATED: CPT

## 2020-04-02 PROCEDURE — 87635 SARS-COV-2 COVID-19 AMP PRB: CPT

## 2020-04-02 PROCEDURE — 85025 COMPLETE CBC W/AUTO DIFF WBC: CPT

## 2020-04-02 PROCEDURE — 84484 ASSAY OF TROPONIN QUANT: CPT

## 2020-04-02 PROCEDURE — 84145 PROCALCITONIN (PCT): CPT

## 2020-04-02 PROCEDURE — 83880 ASSAY OF NATRIURETIC PEPTIDE: CPT

## 2020-04-02 PROCEDURE — 86803 HEPATITIS C AB TEST: CPT

## 2020-04-02 PROCEDURE — 85730 THROMBOPLASTIN TIME PARTIAL: CPT

## 2020-04-02 PROCEDURE — 71045 X-RAY EXAM CHEST 1 VIEW: CPT

## 2020-04-02 PROCEDURE — 83520 IMMUNOASSAY QUANT NOS NONAB: CPT

## 2020-04-02 PROCEDURE — 94640 AIRWAY INHALATION TREATMENT: CPT

## 2020-04-02 PROCEDURE — 85652 RBC SED RATE AUTOMATED: CPT

## 2020-04-02 PROCEDURE — 80053 COMPREHEN METABOLIC PANEL: CPT

## 2020-04-02 PROCEDURE — 83036 HEMOGLOBIN GLYCOSYLATED A1C: CPT

## 2020-04-02 PROCEDURE — 82955 ASSAY OF G6PD ENZYME: CPT

## 2020-04-02 PROCEDURE — 85379 FIBRIN DEGRADATION QUANT: CPT

## 2020-04-02 PROCEDURE — 87070 CULTURE OTHR SPECIMN AEROBIC: CPT

## 2020-04-02 RX ORDER — AZITHROMYCIN 500 MG/1
1 TABLET, FILM COATED ORAL
Qty: 3 | Refills: 0
Start: 2020-04-02 | End: 2020-04-04

## 2020-04-02 RX ORDER — HYDROXYCHLOROQUINE SULFATE 200 MG
1 TABLET ORAL
Qty: 6 | Refills: 0
Start: 2020-04-02 | End: 2020-04-04

## 2020-04-02 RX ADMIN — LETROZOLE 2.5 MILLIGRAM(S): 2.5 TABLET, FILM COATED ORAL at 11:32

## 2020-04-02 RX ADMIN — Medication 1500 MILLIGRAM(S): at 05:39

## 2020-04-02 RX ADMIN — Medication 200 MILLIGRAM(S): at 05:41

## 2020-04-02 RX ADMIN — ALBUTEROL 2 PUFF(S): 90 AEROSOL, METERED ORAL at 02:36

## 2020-04-02 RX ADMIN — AZITHROMYCIN 250 MILLIGRAM(S): 500 TABLET, FILM COATED ORAL at 14:13

## 2020-04-02 RX ADMIN — Medication 40 MILLIGRAM(S): at 05:41

## 2020-04-02 RX ADMIN — ALBUTEROL 2 PUFF(S): 90 AEROSOL, METERED ORAL at 09:27

## 2020-04-02 RX ADMIN — Medication 650 MILLIGRAM(S): at 09:27

## 2020-04-02 RX ADMIN — Medication 650 MILLIGRAM(S): at 02:36

## 2020-04-02 RX ADMIN — Medication 200 MILLIGRAM(S): at 14:14

## 2020-04-02 RX ADMIN — Medication 200 MILLIGRAM(S): at 02:36

## 2020-04-02 RX ADMIN — ENOXAPARIN SODIUM 40 MILLIGRAM(S): 100 INJECTION SUBCUTANEOUS at 14:13

## 2020-04-02 RX ADMIN — ALBUTEROL 2 PUFF(S): 90 AEROSOL, METERED ORAL at 14:15

## 2020-04-02 RX ADMIN — Medication 650 MILLIGRAM(S): at 14:14

## 2020-04-02 RX ADMIN — AMLODIPINE BESYLATE 10 MILLIGRAM(S): 2.5 TABLET ORAL at 05:40

## 2020-04-02 RX ADMIN — Medication 1500 MILLIGRAM(S): at 11:32

## 2020-04-02 RX ADMIN — BUDESONIDE AND FORMOTEROL FUMARATE DIHYDRATE 2 PUFF(S): 160; 4.5 AEROSOL RESPIRATORY (INHALATION) at 06:07

## 2020-04-02 RX ADMIN — Medication 100 MILLIGRAM(S): at 09:27

## 2020-04-02 NOTE — DISCHARGE NOTE NURSING/CASE MANAGEMENT/SOCIAL WORK - PATIENT PORTAL LINK FT
You can access the FollowMyHealth Patient Portal offered by [facility] by registering at the following website: [email or web address]. By joining [facility]’s FollowMyHealth portal, you will also be able to view your health information using other applications (apps) compatible with our system.

## 2020-04-03 LAB
CULTURE RESULTS: NO GROWTH — SIGNIFICANT CHANGE UP
CULTURE RESULTS: NO GROWTH — SIGNIFICANT CHANGE UP
SPECIMEN SOURCE: SIGNIFICANT CHANGE UP
SPECIMEN SOURCE: SIGNIFICANT CHANGE UP

## 2020-04-13 DIAGNOSIS — I10 ESSENTIAL (PRIMARY) HYPERTENSION: ICD-10-CM

## 2020-04-13 DIAGNOSIS — A41.9 SEPSIS, UNSPECIFIED ORGANISM: ICD-10-CM

## 2020-04-13 DIAGNOSIS — Z92.21 PERSONAL HISTORY OF ANTINEOPLASTIC CHEMOTHERAPY: ICD-10-CM

## 2020-04-13 DIAGNOSIS — Z92.3 PERSONAL HISTORY OF IRRADIATION: ICD-10-CM

## 2020-04-13 DIAGNOSIS — J45.901 UNSPECIFIED ASTHMA WITH (ACUTE) EXACERBATION: ICD-10-CM

## 2020-04-13 DIAGNOSIS — M19.90 UNSPECIFIED OSTEOARTHRITIS, UNSPECIFIED SITE: ICD-10-CM

## 2020-04-13 DIAGNOSIS — N61.1 ABSCESS OF THE BREAST AND NIPPLE: ICD-10-CM

## 2020-04-13 DIAGNOSIS — J96.01 ACUTE RESPIRATORY FAILURE WITH HYPOXIA: ICD-10-CM

## 2020-04-13 DIAGNOSIS — A41.89 OTHER SPECIFIED SEPSIS: ICD-10-CM

## 2020-04-13 DIAGNOSIS — U07.1 COVID-19: ICD-10-CM

## 2020-04-13 DIAGNOSIS — Z85.3 PERSONAL HISTORY OF MALIGNANT NEOPLASM OF BREAST: ICD-10-CM

## 2020-04-13 DIAGNOSIS — E66.9 OBESITY, UNSPECIFIED: ICD-10-CM

## 2020-05-10 ENCOUNTER — EMERGENCY (EMERGENCY)
Facility: HOSPITAL | Age: 65
LOS: 1 days | Discharge: HOME | End: 2020-05-10
Attending: EMERGENCY MEDICINE | Admitting: EMERGENCY MEDICINE
Payer: MEDICARE

## 2020-05-10 VITALS
HEIGHT: 63 IN | RESPIRATION RATE: 26 BRPM | WEIGHT: 202.6 LBS | OXYGEN SATURATION: 100 % | SYSTOLIC BLOOD PRESSURE: 186 MMHG | TEMPERATURE: 98 F | DIASTOLIC BLOOD PRESSURE: 106 MMHG | HEART RATE: 142 BPM

## 2020-05-10 VITALS
SYSTOLIC BLOOD PRESSURE: 152 MMHG | RESPIRATION RATE: 18 BRPM | DIASTOLIC BLOOD PRESSURE: 75 MMHG | OXYGEN SATURATION: 98 % | HEART RATE: 90 BPM | TEMPERATURE: 98 F

## 2020-05-10 DIAGNOSIS — Z91.013 ALLERGY TO SEAFOOD: ICD-10-CM

## 2020-05-10 DIAGNOSIS — R06.02 SHORTNESS OF BREATH: ICD-10-CM

## 2020-05-10 DIAGNOSIS — Z98.89 OTHER SPECIFIED POSTPROCEDURAL STATES: Chronic | ICD-10-CM

## 2020-05-10 DIAGNOSIS — Z96.653 PRESENCE OF ARTIFICIAL KNEE JOINT, BILATERAL: Chronic | ICD-10-CM

## 2020-05-10 DIAGNOSIS — U07.1 COVID-19: ICD-10-CM

## 2020-05-10 DIAGNOSIS — Z79.51 LONG TERM (CURRENT) USE OF INHALED STEROIDS: ICD-10-CM

## 2020-05-10 DIAGNOSIS — Z79.899 OTHER LONG TERM (CURRENT) DRUG THERAPY: ICD-10-CM

## 2020-05-10 DIAGNOSIS — Z98.890 OTHER SPECIFIED POSTPROCEDURAL STATES: Chronic | ICD-10-CM

## 2020-05-10 DIAGNOSIS — Z96.643 PRESENCE OF ARTIFICIAL HIP JOINT, BILATERAL: Chronic | ICD-10-CM

## 2020-05-10 DIAGNOSIS — N61.1 ABSCESS OF THE BREAST AND NIPPLE: ICD-10-CM

## 2020-05-10 LAB
ALBUMIN SERPL ELPH-MCNC: 4.4 G/DL — SIGNIFICANT CHANGE UP (ref 3.3–5)
ALP SERPL-CCNC: 163 U/L — HIGH (ref 40–120)
ALT FLD-CCNC: 6 U/L — LOW (ref 10–45)
ANION GAP SERPL CALC-SCNC: 14 MMOL/L — SIGNIFICANT CHANGE UP (ref 5–17)
APTT BLD: 28.1 SEC — SIGNIFICANT CHANGE UP (ref 27.5–36.3)
AST SERPL-CCNC: 26 U/L — SIGNIFICANT CHANGE UP (ref 10–40)
BASE EXCESS BLDV CALC-SCNC: -2.4 MMOL/L — SIGNIFICANT CHANGE UP
BASOPHILS # BLD AUTO: 0.03 K/UL — SIGNIFICANT CHANGE UP (ref 0–0.2)
BASOPHILS NFR BLD AUTO: 0.6 % — SIGNIFICANT CHANGE UP (ref 0–2)
BILIRUB SERPL-MCNC: 0.4 MG/DL — SIGNIFICANT CHANGE UP (ref 0.2–1.2)
BUN SERPL-MCNC: 14 MG/DL — SIGNIFICANT CHANGE UP (ref 7–23)
CALCIUM SERPL-MCNC: 9.4 MG/DL — SIGNIFICANT CHANGE UP (ref 8.4–10.5)
CHLORIDE SERPL-SCNC: 104 MMOL/L — SIGNIFICANT CHANGE UP (ref 96–108)
CO2 SERPL-SCNC: 20 MMOL/L — LOW (ref 22–31)
CREAT SERPL-MCNC: 0.95 MG/DL — SIGNIFICANT CHANGE UP (ref 0.5–1.3)
EOSINOPHIL # BLD AUTO: 0.2 K/UL — SIGNIFICANT CHANGE UP (ref 0–0.5)
EOSINOPHIL NFR BLD AUTO: 3.9 % — SIGNIFICANT CHANGE UP (ref 0–6)
GLUCOSE SERPL-MCNC: 122 MG/DL — HIGH (ref 70–99)
HCO3 BLDV-SCNC: 22 MMOL/L — SIGNIFICANT CHANGE UP (ref 20–27)
HCT VFR BLD CALC: 40 % — SIGNIFICANT CHANGE UP (ref 34.5–45)
HGB BLD-MCNC: 12.7 G/DL — SIGNIFICANT CHANGE UP (ref 11.5–15.5)
IMM GRANULOCYTES NFR BLD AUTO: 0.8 % — SIGNIFICANT CHANGE UP (ref 0–1.5)
INR BLD: 0.9 — SIGNIFICANT CHANGE UP (ref 0.88–1.16)
LACTATE SERPL-SCNC: 2.1 MMOL/L — HIGH (ref 0.5–2)
LYMPHOCYTES # BLD AUTO: 1.17 K/UL — SIGNIFICANT CHANGE UP (ref 1–3.3)
LYMPHOCYTES # BLD AUTO: 22.8 % — SIGNIFICANT CHANGE UP (ref 13–44)
MCHC RBC-ENTMCNC: 29.1 PG — SIGNIFICANT CHANGE UP (ref 27–34)
MCHC RBC-ENTMCNC: 31.8 GM/DL — LOW (ref 32–36)
MCV RBC AUTO: 91.5 FL — SIGNIFICANT CHANGE UP (ref 80–100)
MONOCYTES # BLD AUTO: 0.46 K/UL — SIGNIFICANT CHANGE UP (ref 0–0.9)
MONOCYTES NFR BLD AUTO: 9 % — SIGNIFICANT CHANGE UP (ref 2–14)
NEUTROPHILS # BLD AUTO: 3.23 K/UL — SIGNIFICANT CHANGE UP (ref 1.8–7.4)
NEUTROPHILS NFR BLD AUTO: 62.9 % — SIGNIFICANT CHANGE UP (ref 43–77)
NRBC # BLD: 0 /100 WBCS — SIGNIFICANT CHANGE UP (ref 0–0)
PCO2 BLDV: 34 MMHG — LOW (ref 41–51)
PH BLDV: 7.42 — SIGNIFICANT CHANGE UP (ref 7.32–7.43)
PLATELET # BLD AUTO: 244 K/UL — SIGNIFICANT CHANGE UP (ref 150–400)
PO2 BLDV: 72 MMHG — SIGNIFICANT CHANGE UP
POTASSIUM SERPL-MCNC: 3.8 MMOL/L — SIGNIFICANT CHANGE UP (ref 3.5–5.3)
POTASSIUM SERPL-SCNC: 3.8 MMOL/L — SIGNIFICANT CHANGE UP (ref 3.5–5.3)
PROT SERPL-MCNC: 7.4 G/DL — SIGNIFICANT CHANGE UP (ref 6–8.3)
PROTHROM AB SERPL-ACNC: 10.2 SEC — SIGNIFICANT CHANGE UP (ref 10–12.9)
RBC # BLD: 4.37 M/UL — SIGNIFICANT CHANGE UP (ref 3.8–5.2)
RBC # FLD: 14.6 % — HIGH (ref 10.3–14.5)
SAO2 % BLDV: 95 % — SIGNIFICANT CHANGE UP
SARS-COV-2 RNA SPEC QL NAA+PROBE: DETECTED
SODIUM SERPL-SCNC: 138 MMOL/L — SIGNIFICANT CHANGE UP (ref 135–145)
WBC # BLD: 5.13 K/UL — SIGNIFICANT CHANGE UP (ref 3.8–10.5)
WBC # FLD AUTO: 5.13 K/UL — SIGNIFICANT CHANGE UP (ref 3.8–10.5)

## 2020-05-10 PROCEDURE — 71275 CT ANGIOGRAPHY CHEST: CPT | Mod: 26

## 2020-05-10 PROCEDURE — 93005 ELECTROCARDIOGRAM TRACING: CPT

## 2020-05-10 PROCEDURE — 85379 FIBRIN DEGRADATION QUANT: CPT

## 2020-05-10 PROCEDURE — 36415 COLL VENOUS BLD VENIPUNCTURE: CPT

## 2020-05-10 PROCEDURE — 86140 C-REACTIVE PROTEIN: CPT

## 2020-05-10 PROCEDURE — 85610 PROTHROMBIN TIME: CPT

## 2020-05-10 PROCEDURE — 93010 ELECTROCARDIOGRAM REPORT: CPT

## 2020-05-10 PROCEDURE — 84484 ASSAY OF TROPONIN QUANT: CPT

## 2020-05-10 PROCEDURE — 99285 EMERGENCY DEPT VISIT HI MDM: CPT

## 2020-05-10 PROCEDURE — 71275 CT ANGIOGRAPHY CHEST: CPT

## 2020-05-10 PROCEDURE — 80053 COMPREHEN METABOLIC PANEL: CPT

## 2020-05-10 PROCEDURE — 71045 X-RAY EXAM CHEST 1 VIEW: CPT | Mod: 26

## 2020-05-10 PROCEDURE — 99284 EMERGENCY DEPT VISIT MOD MDM: CPT | Mod: 25

## 2020-05-10 PROCEDURE — 85730 THROMBOPLASTIN TIME PARTIAL: CPT

## 2020-05-10 PROCEDURE — 85025 COMPLETE CBC W/AUTO DIFF WBC: CPT

## 2020-05-10 PROCEDURE — 82728 ASSAY OF FERRITIN: CPT

## 2020-05-10 PROCEDURE — 71045 X-RAY EXAM CHEST 1 VIEW: CPT

## 2020-05-10 PROCEDURE — 83605 ASSAY OF LACTIC ACID: CPT

## 2020-05-10 PROCEDURE — 87635 SARS-COV-2 COVID-19 AMP PRB: CPT

## 2020-05-10 PROCEDURE — 87040 BLOOD CULTURE FOR BACTERIA: CPT

## 2020-05-10 PROCEDURE — 82803 BLOOD GASES ANY COMBINATION: CPT

## 2020-05-10 NOTE — ED PROVIDER NOTE - CLINICAL SUMMARY MEDICAL DECISION MAKING FREE TEXT BOX
Pt w sob, now resolved, lungs clear, VS improving, trop wnl. Ddimer elevated + breast abscess- CT obtained.   No PE, noted abscess, surgery consulted, dispo pending, anticipate dc. Pt w sob, now resolved, lungs clear, VS improving, trop wnl. Ddimer elevated - CT obtained.   No PE, noted breast abscess, surgery consulted, dispo pending, anticipate dc.

## 2020-05-10 NOTE — ED ADULT NURSE NOTE - OBJECTIVE STATEMENT
Pt complains of SOB that started this evening. pt denies any triggers such as cold weather or increased exertion. Pt also complains of feeling her heart "racing." Pt denies any fever, cough, dizziness, chest pain or n/v. Pt was recently discharged for COVID-19. Pt was hospitalized for 4 days.

## 2020-05-10 NOTE — CONSULT NOTE ADULT - SUBJECTIVE AND OBJECTIVE BOX
Attending: Alex    HPI: 65F PMHx HTN, asthma, L breast cancer ER+/KS+ s/p lumpectomy with axillary node dissection (2016, Dr Peterson) and XRT, followed by Dr. Johnson, presented with dyspnea over the last day. Breast surgery consulted for breast abscess identified on CTA. On 3/30, was seen by breast radiology for a breast abscess developing over the prior weeks, s/p FNA with cultures growing group B strep, discharged with doxycycline, admitted to [facility] for COVID pneumonia for 3 days. Over the subsequent weeks, her breast abscess improved with less redness and discharge. Two weeks ago, she noticed that her breast had new purulent drainage, she started doxycycline and noted improvement over the last few days. No fevers, chills, nausea, vomiting or abdominal pain. Tolerating regular diet.    PMH: as above  PSH: , bilateral hip replacement, bilateral knee replacement, lumpectomy   Meds:   · 	Advair Diskus 250 mcg-50 mcg inhalation powder:   · 	ProAir HFA 90 mcg/inh inhalation aerosol: 2 puff(s) inhaled every 6 hours  · 	Norvasc 10 mg oral tablet: 1 tab(s) orally once a day  · 	letrozole 2.5 mg oral tablet: 1 tab(s) orally once a day  Allerg: NKDA  SH: nonsmoker, nondrinker, no other drug use  FH: noncontributory    T(C): 36.6 (05-10-20 @ 05:47), Max: 36.9 (05-10-20 @ 02:08)  HR: 98 (05-10-20 @ 05:47) (98 - 142)  BP: 151/79 (05-10-20 @ 05:47) (151/79 - 194/85)  RR: 17 (05-10-20 @ 05:47) (17 - 26)  SpO2: 100% (05-10-20 @ 05:47) (99% - 100%)    Physical Exam:  General: NAD, resting comfortably in bed  HEENT: MMM  Pulmonary: nonlabored breathing, normal resp effort  Chest: 1cm area of induration about 5cm medial to L nipple with very scant drainage, no tenderness, no erythema  Cardiovascular: RRR  Extremities: WWP, no edema, no calf tenderness  Neuro: no focal deficits  Psych: pleasant    LABS:                        12.7   5.13  )-----------( 244      ( 10 May 2020 02:52 )             40.0     RADIOLOGY & ADDITIONAL STUDIES:  < from: CT Angio Chest PE Protocol w/ IV Cont (05.10.20 @ 04:59) >  2.1 x 1.7 cm collection containing a small fluid and Doppler of gas within the left upper inner breast, just deep to the skin, which demonstrates marked thickening.    < end of copied text >      Assessment: 65F PMHx HTN, asthma, L breast cancer ER+/KS+ s/p lumpectomy with axillary node dissection (, Dr Peterson) and XRT, recently had L breast abscess drained by breast radiology (3/30), admitted to [facility] for COVID pneumonia, presented to the ER with dyspnea, now resolved, with CT showing persistent 2.1 x 1.7 L breast abscess, draining on exam, that is clinically improved per the patient.    Recommendations:  - pending attending evaluation  - discussed with chief on call

## 2020-05-10 NOTE — ED ADULT TRIAGE NOTE - ARRIVAL INFO ADDITIONAL COMMENTS
Patient reports SOB and dyspnea at rest that started yesterday evening. Wheezing auscultated in bilateral lungs. Patient was diagnosed with COVID in 03/2020 at this facility, reports improvement prior to last night. Patient reports SOB and dyspnea at rest that started yesterday evening. Patient was diagnosed with COVID in 03/2020 at this facility, reports improvement prior to last night. Lung sounds clear upon auscultation.

## 2020-05-10 NOTE — ED PROVIDER NOTE - PROGRESS NOTE DETAILS
Tung- pt requesting d/c, does not wish to wait for surgery recs for L breast surgery.  currently taking doxy and states she will call her surgeon tomorrow.  surgery informed.  pt also noted to have covid, no hypoxia or resp distress- educated on supportive measures and isolation.  discussed strict return parameters

## 2020-05-10 NOTE — ED ADULT NURSE NOTE - CHPI ED NUR SYMPTOMS NEG
no chest pain/no chills/no headache/no diaphoresis/no edema/no fever/no body aches/no cough/no wheezing/no hemoptysis

## 2020-05-10 NOTE — CONSULT NOTE ADULT - ASSESSMENT
Senior Resident Assessement:    Patient seen and examined. Patient seems to have an open area which is healing, no purulent drainage, and no signs of infection (no erythema, fluctuance, or purulent drainage). This is still a chronic wound, now from about March 27, but does not look infected. Imaging reviewed, micro reviewed from prior FNA.    Discussed case with Dr. Johnson. Dr. Johnson will follow up with patient in 1 month in the clinic (sooner than the previously scheduled July appointment) and will follow up with patient on Telehealth in a week. The office will reach out to the patient. Patient is also welcome to call the office with concerns. No reason for patient to resume antibiotics at this time as she is afebrile, has no leukocytosis, and no true abscess at breast.

## 2020-05-10 NOTE — ED PROVIDER NOTE - OBJECTIVE STATEMENT
65F with hx of asthma (well controlled on LABA + ICS, never intubated), breast cancer s/p chemo/radiation/lumpectomy 2015 c/b breast abscess s/p I&D now presenting with x1 day history of SOB. stated at 11p was sitting at rest when felt sob, took albuterol inhaler w relief.   Denies associated cp, NVD, lightheaded, diaphoresis, palpitations,  black/bloody stool, LE pain/swelling, focal weakness/numbness, recent travel/immobilization, abd pain, urinary complaints, f/c. No hormone use. No FMH CAD/clots/sudden death. No known prior cardiac w/u. Has some persisting cough.  On doxycycline for breast abscesss.

## 2020-05-10 NOTE — ED PROVIDER NOTE - NSFOLLOWUPINSTRUCTIONS_ED_ALL_ED_FT
Please complete your antibiotics for skin abscess and call to make appointment with your surgeon within 2-3 days.  You were also found to have covid19.  Take tylenol for pain/fever, hydrate with plenty of water and continue self isolation.  Return to ED immediately if you experience fevers, chills, vomiting, dizziness, fainting, shortness of breath, extending redness from abscess.    Skin Abscess     A skin abscess is an infected area of your skin that contains pus and other material. An abscess can happen in any part of your body. Some abscesses break open (rupture) on their own. Most continue to get worse unless they are treated. The infection can spread deeper into the body and into your blood, which can make you feel sick.  A skin abscess is caused by germs that enter the skin through a cut or scrape. It can also be caused by blocked oil and sweat glands or infected hair follicles.  This condition is usually treated by:  Draining the pus.Taking antibiotic medicines. Placing a warm, wet washcloth over the abscess.Follow these instructions at home:  Medicines        Take over-the-counter and prescription medicines only as told by your doctor.If you were prescribed an antibiotic medicine, take it as told by your doctor. Do not stop taking the antibiotic even if you start to feel better.Abscess care        If you have an abscess that has not drained, place a warm, clean, wet washcloth over the abscess several times a day. Do this as told by your doctor.Follow instructions from your doctor about how to take care of your abscess. Make sure you:  Cover the abscess with a bandage (dressing).Change your bandage or gauze as told by your doctor.Wash your hands with soap and water before you change the bandage or gauze. If you cannot use soap and water, use hand .Check your abscess every day for signs that the infection is getting worse. Check for:  More redness, swelling, or pain.More fluid or blood.Warmth.More pus or a bad smell.General instructions     To avoid spreading the infection:  Do not share personal care items, towels, or hot tubs with others.Avoid making skin-to-skin contact with other people.Keep all follow-up visits as told by your doctor. This is important.Contact a doctor if:  You have more redness, swelling, or pain around your abscess.You have more fluid or blood coming from your abscess.Your abscess feels warm when you touch it.You have more pus or a bad smell coming from your abscess.You have a fever.Your muscles ache.You have chills.You feel sick.Get help right away if:  You have very bad (severe) pain.You see red streaks on your skin spreading away from the abscess.Summary  A skin abscess is an infected area of your skin that contains pus and other material.The abscess is caused by germs that enter the skin through a cut or scrape. It can also be caused by blocked oil and sweat glands or infected hair follicles.Follow your doctor's instructions on caring for your abscess, taking medicines, preventing infections, and keeping follow-up visits.This information is not intended to replace advice given to you by your health care provider. Make sure you discuss any questions you have with your health care provider.    COVID-19  COVID-19, also known as coronavirus disease or novel coronavirus, is caused by a type of virus that causes respiratory illness. This may lead to inflammation and the buildup of mucus and fluids in the airway of the lungs (pneumonia). There are many different coronaviruses. Most of these viruses only affect animals, but sometimes these viruses can change and infect people.  What are the causes?  This illness is caused by a virus. You may catch the virus by:  Breathing in droplets from an infected person's cough or sneeze.Touching something, like a table or a doorknob, that was exposed to the virus (contaminated) and then touching your mouth, nose, or eyes.Being around animals that carry the virus, or eating uncooked or undercooked meat or animal products that contain the virus.What increases the risk?  You are more likely to develop this condition if you:  Live in or travel to an area with a COVID-19 outbreak.Come in contact with a sick person who recently traveled to an area with a COVID-19 outbreak.Provide care for or live with a person who is infected with COVID-19.What are the signs or symptoms?  COVID-19 causes respiratory illness that can lead to pneumonia. Symptoms of pneumonia may include:  A fever.A cough.Difficulty breathing.How is this diagnosed?  This condition may be diagnosed based on:  Your signs and symptoms, especially if:  You live in an area with a COVID-19 outbreak.You recently traveled to or from an area where the virus is common.You provide care for or live with a person who was diagnosed with COVID-19.A physical exam.Lab tests, which may include:  A nasal swab to take a sample of fluid from your nose.A throat swab to take a sample of fluid from your throat.A sample of mucus from your lungs (sputum).Blood tests.How is this treated?  There is no medicine to treat COVID-19. Your health care provider will talk with you about ways to treat your symptoms. This may include rest, fluids, and over-the-counter medicines.  Follow these instructions at home:  Lifestyle     Use a cool-mist humidifier to add moisture to the air. This can help you breathe more easily.Do not use any products that contain nicotine or tobacco, such as cigarettes, e-cigarettes, and chewing tobacco. If you need help quitting, ask your health care provider.Rest at home as told by your health care provider.Return to your normal activities as told by your health care provider. Ask your health care provider what activities are safe for you.General instructions     Take over-the-counter and prescription medicines only as told by your health care provider.Drink enough fluid to keep your urine pale yellow.Keep all follow-up visits as told by your health care provider. This is important.How is this prevented?     There is no vaccine to help prevent COVID-19 infection. However, there are steps you can take to protect yourself and others from this virus.  To protect yourself:     Do not travel to areas where COVID-19 is a risk. The areas where COVID-19 is reported change often. To identify high-risk areas, check the CDC travel website: wwwnc.cdc.gov/travel/noticesIf you live in, or must travel to, an area where COVID-19 is a risk, take precautions to avoid infection.  Stay away from people who are sick.Stay away from places where there are animals that may carry the virus. This includes places where animals and animal products are sold. Note that both living and dead animals can carry the virus.Wash your hands often with soap and water. If soap and water are not available, use an alcohol-based hand .Avoid touching your mouth, face, eyes, or nose.To protect others:     If you have symptoms, take steps to prevent the virus from spreading to others.  If you think you have a COVID-19 infection, contact your health care provider right away. Tell your health care team that you think you may have a COVID-19 infection.Stay home. Leave your house only to seek medical care.Do not travel while you are sick.Wash your hands often with soap and water. If soap and water are not available, use alcohol-based hand .Stay away from other members of your household. If possible, stay in your own room, separate from others. Use a different bathroom.Make sure that all people in your household wash their hands well and often.Cough or sneeze into a tissue or your sleeve or elbow. Do not cough or sneeze into your hand or into the air.Wear a face mask.Where to find more information  Centers for Disease Control and Prevention: www.cdc.gov/coronavirus/2019-ncov/index.htmlWProv[facility] Organization: www.who.int/health-topics/coronavirusContact a health care provider if:  You have traveled to an area where COVID-19 is a risk and you have symptoms of the infection.You have contact with someone who has traveled to an area where COVID-19 is a risk and you have symptoms of the infection.Get help right away if:  You have trouble breathing.You have chest pain.Summary  COVID-19 is caused by a type of virus that causes respiratory illness. This may lead to inflammation and the buildup of mucus and fluids in the airway of the lungs (pneumonia).You are more likely to develop this condition if you live in or travel to an area with a COVID-19 outbreak.There is no medicine to treat COVID-19. Your health care provider will talk with you about ways to treat your symptoms.Take steps to protect yourself and others from infection. Wash your hands often. Stay away from other people who are sick and wear a mask if you are sick.This information is not intended to replace advice given to you by your health care provider. Make sure you discuss any questions you have with your health care provider.

## 2020-05-10 NOTE — ED PROVIDER NOTE - PATIENT PORTAL LINK FT
You can access the FollowMyHealth Patient Portal offered by [facility] by registering at the following website: [email or web address]. By joining [facility]’s FollowMyHealth portal, you will also be able to view your health information using other applications (apps) compatible with our system.

## 2020-05-10 NOTE — ED PROVIDER NOTE - PHYSICAL EXAMINATION
CONSTITUTIONAL: Well appearing, well nourished, awake, alert and in no apparent distress.  HEENT: Head is atraumatic. Eyes clear bilaterally, normal EOMI. Airway patent.  CARDIAC: Normal rate, regular rhythm.  Heart sounds S1, S2.   RESPIRATORY: Breath sounds clear and equal bilaterally. no tachypnea, respiratory distress.   GASTROINTESTINAL: Abdomen soft, non-tender, no guarding, distension.  MUSCULOSKELETAL: Spine appears normal, no midline spinal tenderness, range of motion is not limited, no muscle or joint tenderness. no bony tenderness. no JVD, peripheral edema. small oozing from L breast abscess  NEUROLOGICAL: Alert and oriented, no focal deficits, no motor or sensory deficits.  SKIN: Skin normal color for race, warm, dry and intact. No evidence of rash.  PSYCHIATRIC: Alert and oriented to person, place, time/situation. normal mood and affect. no apparent risk to self or others.

## 2020-05-10 NOTE — ED PROVIDER NOTE - CARE PLAN
Principal Discharge DX:	Dyspnea  Secondary Diagnosis:	COVID-19 Principal Discharge DX:	Dyspnea  Secondary Diagnosis:	COVID-19  Secondary Diagnosis:	Breast abscess

## 2020-05-11 ENCOUNTER — APPOINTMENT (OUTPATIENT)
Dept: BREAST CENTER | Facility: CLINIC | Age: 65
End: 2020-05-11
Payer: MEDICARE

## 2020-05-11 PROCEDURE — G2012 BRIEF CHECK IN BY MD/QHP: CPT

## 2020-05-15 LAB
CULTURE RESULTS: SIGNIFICANT CHANGE UP
CULTURE RESULTS: SIGNIFICANT CHANGE UP
SPECIMEN SOURCE: SIGNIFICANT CHANGE UP
SPECIMEN SOURCE: SIGNIFICANT CHANGE UP

## 2020-05-18 ENCOUNTER — TRANSCRIPTION ENCOUNTER (OUTPATIENT)
Age: 65
End: 2020-05-18

## 2020-05-22 NOTE — PROGRESS NOTE ADULT - PROBLEM/PLAN-2
Patient contacted, patient identified with two identifiers (Name & ). Patient aware of results per  and verbalizes understanding.
DISPLAY PLAN FREE TEXT
DISPLAY PLAN FREE TEXT

## 2020-05-26 ENCOUNTER — TRANSCRIPTION ENCOUNTER (OUTPATIENT)
Age: 65
End: 2020-05-26

## 2020-06-26 ENCOUNTER — TRANSCRIPTION ENCOUNTER (OUTPATIENT)
Age: 65
End: 2020-06-26

## 2020-07-15 ENCOUNTER — APPOINTMENT (OUTPATIENT)
Dept: BREAST CENTER | Facility: CLINIC | Age: 65
End: 2020-07-15
Payer: MEDICARE

## 2020-07-15 VITALS — WEIGHT: 203 LBS | BODY MASS INDEX: 35.97 KG/M2 | HEART RATE: 94 BPM | HEIGHT: 63 IN | OXYGEN SATURATION: 99 %

## 2020-07-15 VITALS — SYSTOLIC BLOOD PRESSURE: 148 MMHG | DIASTOLIC BLOOD PRESSURE: 82 MMHG

## 2020-07-15 PROCEDURE — 99213 OFFICE O/P EST LOW 20 MIN: CPT

## 2020-07-20 NOTE — HISTORY OF PRESENT ILLNESS
[FreeTextEntry1] : Brandi is a 63 yo female presents for follow-up and physical exam. She has a previous hx of left 11:00 invasive mammary cancer (ER positive 40%/CO-/HER2 equivocal FISH positive) with left axillary lymph node positive for metastatic adenocarcinoma, consistent with breast origin, LCIS and ALH, s/p neoadjuvant chemo, wide local excision and ALND (2/29 LNS positive) in 2016. S/p XRT in 2017. On letrozole, managed by Dr. Eid (med onc) of which she followed up with last month.\par \par Patient had a boil on her left medial breast lanced a couple months ago, the boil also responded to antibiotics. However the area is not fully healed. Discussed with her that this does not currently look infected, and that it is likely having healing issues due to the radiated tissue and she admitted to applying hydrogen peroxide daily. Recommended to stop the hydrogen peroxide. Will see how the MRI looks in regards to any sort of malignant cause, if normal will cauterize with silver nitrate in the office.\par Denies nipple discharge, nipple/breast skin changes or dimpling. Denies fever and chills.\par \par \par \par

## 2020-07-20 NOTE — DATA REVIEWED
[FreeTextEntry1] : -- 1/16/19 ([facility]) b/l mmg: left breast small group of microcalcs, below the lumpectomy site, recommend additional spot compression views. Left breast, subcentimeter nodularity behind the nipple adjacent to lumpectomy site; patient is scheduled for f/u breast MRI. BI-RADS 0. \par \par -- 2/17/19 ([facility]) MRI breasts: 1 cm enhancing lesion at 7N16.5 in the right breast, recommend US bx if amendable to US. Left breast, 0.6 cm enhancing lesion within the skin at 10N4.5 recommend targeted US. Left breast 0.5 cm 10N6.5 suspicious enhancing lesion, recommend targeted US with biopsy is amendable, otherwise MRI bx is recommended. BI-RADS 4. \par \par -- 4/4/19 ([facility]) US breasts b/l: No lesion visualized to correspond to the right breast lesion at 7n16.5 seen on MRI; however pt reports a history of a "boil" which has resolved, which may have corresponded, this can be confirmed on the day of the left breast MRI bx. Left breast 0.8 x 0.7 cm hypoechoic lesion within the skin at 10N4.5 corresponds to 0.6 cm lesion seen on MRI; given hx of cancer, biopsy is recommended. No sono abnormality seen at the 10N6.5 to correspond to 0.5 cm lesion seen on MRI; MRI bx is recommended. BI-RADS 4.\par \par -- 5/17/19 ([facility]) left breast 10n4.5 MRI biopsy path: minute narrow linear structure with atypical epithelial cells. Non-epithelial lined cyst 1.75 mm w/ fibrous wall c/w old fat necrosis; rare minute atrophic lobules showing alterations c/w treatment effect. This biopsy does not explain the presence of the 0.5 cm lesion seen on MRI, please correlate with imaging studies. CONCORDANCE PENDING (radiology was contacted and will look into this). \par \par --3/9/2020 ([facility]) b/l mammogram showing no asymmetry in the medial left breast on the craniocaudal view at the site of most recent lumpectomy consistent with postoperative change. Diffuse left breast skin thickening and increased trabecular markings are consistent with prior radiation. Benign-appearing calcifications bilaterally are unchanged. There are no new suspicious masses or suspicious microcalcifications in either breast.\par Postoperative and postradiation changes in the left breast. Continued 6 month follow up left breast mammogram recommended as well as breast MRI. BIRADS 3

## 2020-07-20 NOTE — PAST MEDICAL HISTORY
[Postmenopausal] : The patient is postmenopausal [Menopause Age____] : age at menopause was [unfilled] [Menarche Age ____] : age at menarche was [unfilled] [unknown] : the patient is unsure of the date of her LMP [Total Preg ___] : G[unfilled] [Live Births ___] : P[unfilled]  [Living ___] : Living: [unfilled] [Age At Live Birth ___] : Age at live birth: [unfilled] [FreeTextEntry5] : - 1984 [FreeTextEntry6] : None [History of Hormone Replacement Treatment] : has no history of hormone replacement treatment [FreeTextEntry8] : None [FreeTextEntry7] : Hx of oral contraceptive pills, stopped more than 20 years ago

## 2020-07-20 NOTE — REASON FOR VISIT
[Follow-Up: _____] : a [unfilled] follow-up visit [FreeTextEntry1] : hx of left breast invasive mammary cancer w/ LN mets, s/p neoadjuvant chemo f/b lumpectomy in 2016; XRT completed in 2017.

## 2020-09-10 ENCOUNTER — RESULT REVIEW (OUTPATIENT)
Age: 65
End: 2020-09-10

## 2020-09-10 ENCOUNTER — OUTPATIENT (OUTPATIENT)
Dept: OUTPATIENT SERVICES | Facility: HOSPITAL | Age: 65
LOS: 1 days | End: 2020-09-10
Payer: MEDICARE

## 2020-09-10 ENCOUNTER — APPOINTMENT (OUTPATIENT)
Dept: MAMMOGRAPHY | Facility: HOSPITAL | Age: 65
End: 2020-09-10

## 2020-09-10 ENCOUNTER — APPOINTMENT (OUTPATIENT)
Dept: MRI IMAGING | Facility: HOSPITAL | Age: 65
End: 2020-09-10
Payer: MEDICARE

## 2020-09-10 DIAGNOSIS — Z98.890 OTHER SPECIFIED POSTPROCEDURAL STATES: Chronic | ICD-10-CM

## 2020-09-10 DIAGNOSIS — Z96.653 PRESENCE OF ARTIFICIAL KNEE JOINT, BILATERAL: Chronic | ICD-10-CM

## 2020-09-10 DIAGNOSIS — Z96.643 PRESENCE OF ARTIFICIAL HIP JOINT, BILATERAL: Chronic | ICD-10-CM

## 2020-09-10 DIAGNOSIS — Z98.89 OTHER SPECIFIED POSTPROCEDURAL STATES: Chronic | ICD-10-CM

## 2020-09-10 PROCEDURE — C8937: CPT

## 2020-09-10 PROCEDURE — 77066 DX MAMMO INCL CAD BI: CPT | Mod: 26

## 2020-09-10 PROCEDURE — G0279: CPT | Mod: 26

## 2020-09-10 PROCEDURE — G0279: CPT

## 2020-09-10 PROCEDURE — 77049 MRI BREAST C-+ W/CAD BI: CPT

## 2020-09-10 PROCEDURE — A9585: CPT

## 2020-09-10 PROCEDURE — 77066 DX MAMMO INCL CAD BI: CPT

## 2020-09-10 PROCEDURE — 77049 MRI BREAST C-+ W/CAD BI: CPT | Mod: 26

## 2021-01-26 ENCOUNTER — NON-APPOINTMENT (OUTPATIENT)
Age: 66
End: 2021-01-26

## 2021-01-28 ENCOUNTER — NON-APPOINTMENT (OUTPATIENT)
Age: 66
End: 2021-01-28

## 2021-03-15 ENCOUNTER — APPOINTMENT (OUTPATIENT)
Dept: BREAST CENTER | Facility: CLINIC | Age: 66
End: 2021-03-15
Payer: MEDICARE

## 2021-03-15 VITALS — HEART RATE: 95 BPM | BODY MASS INDEX: 35.97 KG/M2 | OXYGEN SATURATION: 97 % | HEIGHT: 63 IN | WEIGHT: 203 LBS

## 2021-03-15 PROCEDURE — 99072 ADDL SUPL MATRL&STAF TM PHE: CPT

## 2021-03-15 PROCEDURE — 99213 OFFICE O/P EST LOW 20 MIN: CPT

## 2021-03-15 NOTE — DATA REVIEWED
[FreeTextEntry1] : -- 1/16/19 ([facility]) b/l mmg: left breast small group of microcalcs, below the lumpectomy site, recommend additional spot compression views. Left breast, subcentimeter nodularity behind the nipple adjacent to lumpectomy site; patient is scheduled for f/u breast MRI. BI-RADS 0. \par \par -- 2/17/19 ([facility]) MRI breasts: 1 cm enhancing lesion at 7N16.5 in the right breast, recommend US bx if amendable to US. Left breast, 0.6 cm enhancing lesion within the skin at 10N4.5 recommend targeted US. Left breast 0.5 cm 10N6.5 suspicious enhancing lesion, recommend targeted US with biopsy is amendable, otherwise MRI bx is recommended. BI-RADS 4. \par \par -- 4/4/19 ([facility]) US breasts b/l: No lesion visualized to correspond to the right breast lesion at 7n16.5 seen on MRI; however pt reports a history of a "boil" which has resolved, which may have corresponded, this can be confirmed on the day of the left breast MRI bx. Left breast 0.8 x 0.7 cm hypoechoic lesion within the skin at 10N4.5 corresponds to 0.6 cm lesion seen on MRI; given hx of cancer, biopsy is recommended. No sono abnormality seen at the 10N6.5 to correspond to 0.5 cm lesion seen on MRI; MRI bx is recommended. BI-RADS 4.\par \par -- 5/17/19 ([facility]) left breast 10n4.5 MRI biopsy path: minute narrow linear structure with atypical epithelial cells. Non-epithelial lined cyst 1.75 mm w/ fibrous wall c/w old fat necrosis; rare minute atrophic lobules showing alterations c/w treatment effect. This biopsy does not explain the presence of the 0.5 cm lesion seen on MRI, please correlate with imaging studies. CONCORDANCE PENDING (radiology was contacted and will look into this). \par \par --3/9/2020 ([facility]) b/l mammogram showing no asymmetry in the medial left breast on the craniocaudal view at the site of most recent lumpectomy consistent with postoperative change. Diffuse left breast skin thickening and increased trabecular markings are consistent with prior radiation. Benign-appearing calcifications bilaterally are unchanged. There are no new suspicious masses or suspicious microcalcifications in either breast.\par Postoperative and postradiation changes in the left breast. Continued 6 month follow up left breast mammogram recommended as well as breast MRI. BIRADS 3 \par \par 9/10/2020 ([facility]) bilateral mammogram showing scattered areas of fibroglandular density, benign mamographic findings without interval change, no mammographic evidence of malignancy. Benign findings BIRADS 2\par \par 9/10/2020 ([facility]) bilateral breast MRI showing now MRI evidence of malignancy Benign findings BIRADS 2

## 2021-03-15 NOTE — HISTORY OF PRESENT ILLNESS
[FreeTextEntry1] : Brandi is a 66 yo female presents for follow-up; she has a previous hx of left 11:00 invasive mammary cancer (ER positive 40%/CO-/HER2 equivocal FISH positive) with left axillary lymph node positive for metastatic adenocarcinoma, consistent with breast origin, LCIS and ALH, s/p neoadjuvant chemo, wide local excision and ALND (2/29 LNS positive) in 2016. S/p XRT in 2017. On letrozole, managed by Dr. Eid (med onc); will have completed 5 year course at the end of this month. Denies nipple discharge, nipple/breast skin changes or dimpling. Denies fever and chills.\par \par Patient had a boil on her left medial breast lanced in May 2020; the boil also responded to antibiotics. The area was not fully healed, however it also did not look infected, on her last exam in July 2020. Discussed with her that this is likely healing issues due to the radiated tissue and she admitted to applying hydrogen peroxide daily. Recommended to stop the hydrogen peroxide. MRI was negative on 9/10/20. The patient reports that the area has completely healed. \par \par Will consider spacing out the MRIs and going to annual visits after this fall's appointment/imaging.\par \par \par

## 2021-09-13 ENCOUNTER — RESULT REVIEW (OUTPATIENT)
Age: 66
End: 2021-09-13

## 2021-09-13 ENCOUNTER — OUTPATIENT (OUTPATIENT)
Dept: OUTPATIENT SERVICES | Facility: HOSPITAL | Age: 66
LOS: 1 days | End: 2021-09-13
Payer: MEDICARE

## 2021-09-13 ENCOUNTER — APPOINTMENT (OUTPATIENT)
Dept: MAMMOGRAPHY | Facility: HOSPITAL | Age: 66
End: 2021-09-13
Payer: MEDICARE

## 2021-09-13 ENCOUNTER — APPOINTMENT (OUTPATIENT)
Dept: MRI IMAGING | Facility: HOSPITAL | Age: 66
End: 2021-09-13
Payer: MEDICARE

## 2021-09-13 DIAGNOSIS — Z96.643 PRESENCE OF ARTIFICIAL HIP JOINT, BILATERAL: Chronic | ICD-10-CM

## 2021-09-13 DIAGNOSIS — Z98.890 OTHER SPECIFIED POSTPROCEDURAL STATES: Chronic | ICD-10-CM

## 2021-09-13 DIAGNOSIS — Z96.653 PRESENCE OF ARTIFICIAL KNEE JOINT, BILATERAL: Chronic | ICD-10-CM

## 2021-09-13 DIAGNOSIS — Z98.89 OTHER SPECIFIED POSTPROCEDURAL STATES: Chronic | ICD-10-CM

## 2021-09-13 PROCEDURE — 77049 MRI BREAST C-+ W/CAD BI: CPT | Mod: 26

## 2021-09-13 PROCEDURE — 77067 SCR MAMMO BI INCL CAD: CPT

## 2021-09-13 PROCEDURE — 77063 BREAST TOMOSYNTHESIS BI: CPT

## 2021-09-13 PROCEDURE — 77067 SCR MAMMO BI INCL CAD: CPT | Mod: 26

## 2021-09-13 PROCEDURE — C8908: CPT

## 2021-09-13 PROCEDURE — A9585: CPT

## 2021-09-13 PROCEDURE — 77063 BREAST TOMOSYNTHESIS BI: CPT | Mod: 26

## 2021-09-13 PROCEDURE — C8937: CPT

## 2021-09-14 ENCOUNTER — NON-APPOINTMENT (OUTPATIENT)
Age: 66
End: 2021-09-14

## 2021-09-20 ENCOUNTER — APPOINTMENT (OUTPATIENT)
Dept: BREAST CENTER | Facility: CLINIC | Age: 66
End: 2021-09-20

## 2021-09-21 ENCOUNTER — NON-APPOINTMENT (OUTPATIENT)
Age: 66
End: 2021-09-21

## 2021-11-15 ENCOUNTER — APPOINTMENT (OUTPATIENT)
Dept: BREAST CENTER | Facility: CLINIC | Age: 66
End: 2021-11-15
Payer: MEDICARE

## 2021-11-15 VITALS — BODY MASS INDEX: 39.85 KG/M2 | HEIGHT: 60 IN | WEIGHT: 203 LBS | HEART RATE: 97 BPM | OXYGEN SATURATION: 97 %

## 2021-11-15 PROCEDURE — 99213 OFFICE O/P EST LOW 20 MIN: CPT

## 2021-11-19 NOTE — DATA REVIEWED
[FreeTextEntry1] : -- 1/16/19 ([facility]) b/l mmg: left breast small group of microcalcs, below the lumpectomy site, recommend additional spot compression views. Left breast, subcentimeter nodularity behind the nipple adjacent to lumpectomy site; patient is scheduled for f/u breast MRI. BI-RADS 0. \par \par -- 2/17/19 ([facility]) MRI breasts: 1 cm enhancing lesion at 7N16.5 in the right breast, recommend US bx if amendable to US. Left breast, 0.6 cm enhancing lesion within the skin at 10N4.5 recommend targeted US. Left breast 0.5 cm 10N6.5 suspicious enhancing lesion, recommend targeted US with biopsy is amendable, otherwise MRI bx is recommended. BI-RADS 4. \par \par -- 4/4/19 ([facility]) US breasts b/l: No lesion visualized to correspond to the right breast lesion at 7n16.5 seen on MRI; however pt reports a history of a "boil" which has resolved, which may have corresponded, this can be confirmed on the day of the left breast MRI bx. Left breast 0.8 x 0.7 cm hypoechoic lesion within the skin at 10N4.5 corresponds to 0.6 cm lesion seen on MRI; given hx of cancer, biopsy is recommended. No sono abnormality seen at the 10N6.5 to correspond to 0.5 cm lesion seen on MRI; MRI bx is recommended. BI-RADS 4.\par \par -- 5/17/19 ([facility]) left breast 10n4.5 MRI biopsy path: minute narrow linear structure with atypical epithelial cells. Non-epithelial lined cyst 1.75 mm w/ fibrous wall c/w old fat necrosis; rare minute atrophic lobules showing alterations c/w treatment effect. This biopsy does not explain the presence of the 0.5 cm lesion seen on MRI, please correlate with imaging studies. CONCORDANCE PENDING (radiology was contacted and will look into this). \par \par --3/9/2020 ([facility]) b/l mammogram showing no asymmetry in the medial left breast on the craniocaudal view at the site of most recent lumpectomy consistent with postoperative change. Diffuse left breast skin thickening and increased trabecular markings are consistent with prior radiation. Benign-appearing calcifications bilaterally are unchanged. There are no new suspicious masses or suspicious microcalcifications in either breast.\par Postoperative and postradiation changes in the left breast. Continued 6 month follow up left breast mammogram recommended as well as breast MRI. BIRADS 3 \par \par 9/10/2020 ([facility]) bilateral mammogram showing scattered areas of fibroglandular density, benign mamographic findings without interval change, no mammographic evidence of malignancy. Benign findings BIRADS 2\par \par 9/10/2020 ([facility]) bilateral breast MRI showing now MRI evidence of malignancy Benign findings BIRADS 2\par \par 9/13/21 ([facility]) b/l mmg: scattered fbg density. No e/o malignancy. BI-RADS 2.\par \par 9/13/21 ([facility]) MRI breasts: No suspicious enhancement to warrant biopsy. BI-RADS 2.

## 2021-11-19 NOTE — HISTORY OF PRESENT ILLNESS
[FreeTextEntry1] : Brandi is a 66 yo female presents for follow-up; she has a previous hx of left 11:00 invasive mammary cancer (ER positive 40%/OH-/HER2 equivocal FISH positive) with left axillary lymph node positive for metastatic adenocarcinoma, consistent with breast origin, LCIS and ALH, s/p neoadjuvant chemo, wide local excision and ALND (2/29 LNS positive) in 2016. S/p XRT in 2017. On letrozole, managed by Dr. Eid (med onc); will have completed 5 year course at the end of this month. Denies nipple discharge, nipple/breast skin changes or dimpling. Denies fever and chills.\par \par Patient had a boil on her left medial breast lanced in May 2020; the boil also responded to antibiotics. The area was not fully healed, however it also did not look infected, on her last exam in July 2020. Discussed with her that this is likely healing issues due to the radiated tissue and she admitted to applying hydrogen peroxide daily. Recommended to stop the hydrogen peroxide. MRI was negative on 9/10/20. The patient reports that the area has completely healed. \par \par Will consider spacing out the MRIs and going to annual visits.\par \par \par

## 2022-06-06 ENCOUNTER — NON-APPOINTMENT (OUTPATIENT)
Age: 67
End: 2022-06-06

## 2022-07-18 ENCOUNTER — APPOINTMENT (OUTPATIENT)
Dept: BREAST CENTER | Facility: CLINIC | Age: 67
End: 2022-07-18

## 2022-07-18 VITALS — BODY MASS INDEX: 39.85 KG/M2 | WEIGHT: 203 LBS | HEIGHT: 60 IN | HEART RATE: 88 BPM | OXYGEN SATURATION: 100 %

## 2022-07-18 PROCEDURE — 99213 OFFICE O/P EST LOW 20 MIN: CPT

## 2022-07-20 NOTE — DATA REVIEWED
[FreeTextEntry1] : -- 1/16/19 ([facility]) b/l mmg: left breast small group of microcalcs, below the lumpectomy site, recommend additional spot compression views. Left breast, subcentimeter nodularity behind the nipple adjacent to lumpectomy site; patient is scheduled for f/u breast MRI. BI-RADS 0. \par \par -- 2/17/19 ([facility]) MRI breasts: 1 cm enhancing lesion at 7N16.5 in the right breast, recommend US bx if amendable to US. Left breast, 0.6 cm enhancing lesion within the skin at 10N4.5 recommend targeted US. Left breast 0.5 cm 10N6.5 suspicious enhancing lesion, recommend targeted US with biopsy is amendable, otherwise MRI bx is recommended. BI-RADS 4. \par \par -- 4/4/19 ([facility]) US breasts b/l: No lesion visualized to correspond to the right breast lesion at 7n16.5 seen on MRI; however pt reports a history of a "boil" which has resolved, which may have corresponded, this can be confirmed on the day of the left breast MRI bx. Left breast 0.8 x 0.7 cm hypoechoic lesion within the skin at 10N4.5 corresponds to 0.6 cm lesion seen on MRI; given hx of cancer, biopsy is recommended. No sono abnormality seen at the 10N6.5 to correspond to 0.5 cm lesion seen on MRI; MRI bx is recommended. BI-RADS 4.\par \par -- 5/17/19 ([facility]) left breast 10n4.5 MRI biopsy path: minute narrow linear structure with atypical epithelial cells. Non-epithelial lined cyst 1.75 mm w/ fibrous wall c/w old fat necrosis; rare minute atrophic lobules showing alterations c/w treatment effect. This biopsy does not explain the presence of the 0.5 cm lesion seen on MRI, please correlate with imaging studies. CONCORDANCE PENDING (radiology was contacted and will look into this). \par \par --3/9/2020 ([facility]) b/l mammogram showing no asymmetry in the medial left breast on the craniocaudal view at the site of most recent lumpectomy consistent with postoperative change. Diffuse left breast skin thickening and increased trabecular markings are consistent with prior radiation. Benign-appearing calcifications bilaterally are unchanged. There are no new suspicious masses or suspicious microcalcifications in either breast.\par Postoperative and postradiation changes in the left breast. Continued 6 month follow up left breast mammogram recommended as well as breast MRI. BIRADS 3 \par \par 9/10/2020 ([facility]) bilateral mammogram showing scattered areas of fibroglandular density, benign mamographic findings without interval change, no mammographic evidence of malignancy. Benign findings BIRADS 2\par \par 9/10/2020 ([facility]) bilateral breast MRI showing now MRI evidence of malignancy Benign findings BIRADS 2\par \par 9/13/21 ([facility]) b/l mmg: scattered fbg density. No e/o malignancy. BI-RADS 2.\par \par 9/13/21 ([facility]) MRI breasts: No suspicious enhancement to warrant biopsy. BI-RADS 2.

## 2022-07-20 NOTE — HISTORY OF PRESENT ILLNESS
[FreeTextEntry1] : Brandi is a 66 yo female presents for follow-up; she has a previous hx of left 11:00 invasive mammary cancer (ER positive 40%/OH-/HER2 equivocal FISH positive) with left axillary lymph node positive for metastatic adenocarcinoma, consistent with breast origin, LCIS and ALH, s/p neoadjuvant chemo, wide local excision and ALND (2/29 LNS positive) in 2016. S/p XRT in 2017. On letrozole, managed by Dr. Eid (med onc); will have completed 5 year course at the end of this month. Denies nipple discharge, nipple/breast skin changes or dimpling. Denies fever and chills.\par \par \par \par

## 2022-09-19 ENCOUNTER — OUTPATIENT (OUTPATIENT)
Dept: OUTPATIENT SERVICES | Facility: HOSPITAL | Age: 67
LOS: 1 days | End: 2022-09-19
Payer: MEDICARE

## 2022-09-19 ENCOUNTER — RESULT REVIEW (OUTPATIENT)
Age: 67
End: 2022-09-19

## 2022-09-19 ENCOUNTER — APPOINTMENT (OUTPATIENT)
Dept: MAMMOGRAPHY | Facility: HOSPITAL | Age: 67
End: 2022-09-19

## 2022-09-19 ENCOUNTER — APPOINTMENT (OUTPATIENT)
Dept: ULTRASOUND IMAGING | Facility: HOSPITAL | Age: 67
End: 2022-09-19

## 2022-09-19 DIAGNOSIS — Z96.653 PRESENCE OF ARTIFICIAL KNEE JOINT, BILATERAL: Chronic | ICD-10-CM

## 2022-09-19 DIAGNOSIS — Z98.890 OTHER SPECIFIED POSTPROCEDURAL STATES: Chronic | ICD-10-CM

## 2022-09-19 DIAGNOSIS — Z96.643 PRESENCE OF ARTIFICIAL HIP JOINT, BILATERAL: Chronic | ICD-10-CM

## 2022-09-19 DIAGNOSIS — Z98.89 OTHER SPECIFIED POSTPROCEDURAL STATES: Chronic | ICD-10-CM

## 2022-09-19 PROCEDURE — 76642 ULTRASOUND BREAST LIMITED: CPT

## 2022-09-19 PROCEDURE — 77063 BREAST TOMOSYNTHESIS BI: CPT | Mod: 26

## 2022-09-19 PROCEDURE — 77067 SCR MAMMO BI INCL CAD: CPT | Mod: 26

## 2022-09-19 PROCEDURE — 76642 ULTRASOUND BREAST LIMITED: CPT | Mod: 26,50

## 2022-09-19 PROCEDURE — 77063 BREAST TOMOSYNTHESIS BI: CPT

## 2022-09-19 PROCEDURE — 77067 SCR MAMMO BI INCL CAD: CPT

## 2022-10-18 ENCOUNTER — RESULT REVIEW (OUTPATIENT)
Age: 67
End: 2022-10-18

## 2022-10-18 ENCOUNTER — OUTPATIENT (OUTPATIENT)
Dept: OUTPATIENT SERVICES | Facility: HOSPITAL | Age: 67
LOS: 1 days | End: 2022-10-18
Payer: MEDICARE

## 2022-10-18 ENCOUNTER — APPOINTMENT (OUTPATIENT)
Dept: MAMMOGRAPHY | Facility: HOSPITAL | Age: 67
End: 2022-10-18

## 2022-10-18 DIAGNOSIS — Z96.643 PRESENCE OF ARTIFICIAL HIP JOINT, BILATERAL: Chronic | ICD-10-CM

## 2022-10-18 DIAGNOSIS — Z96.653 PRESENCE OF ARTIFICIAL KNEE JOINT, BILATERAL: Chronic | ICD-10-CM

## 2022-10-18 DIAGNOSIS — Z98.890 OTHER SPECIFIED POSTPROCEDURAL STATES: Chronic | ICD-10-CM

## 2022-10-18 DIAGNOSIS — Z98.89 OTHER SPECIFIED POSTPROCEDURAL STATES: Chronic | ICD-10-CM

## 2022-10-18 PROCEDURE — 19081 BX BREAST 1ST LESION STRTCTC: CPT

## 2022-10-18 PROCEDURE — A4648: CPT

## 2022-10-18 PROCEDURE — 77065 DX MAMMO INCL CAD UNI: CPT

## 2022-10-18 PROCEDURE — 88305 TISSUE EXAM BY PATHOLOGIST: CPT | Mod: 26

## 2022-10-18 PROCEDURE — 88305 TISSUE EXAM BY PATHOLOGIST: CPT

## 2022-10-18 PROCEDURE — 19081 BX BREAST 1ST LESION STRTCTC: CPT | Mod: LT

## 2022-10-18 PROCEDURE — 77065 DX MAMMO INCL CAD UNI: CPT | Mod: 26,LT

## 2022-10-19 LAB — SURGICAL PATHOLOGY STUDY: SIGNIFICANT CHANGE UP

## 2022-10-21 ENCOUNTER — NON-APPOINTMENT (OUTPATIENT)
Age: 67
End: 2022-10-21

## 2023-01-02 NOTE — H&P ADULT - NSHPPHYSICALEXAM_GEN_ALL_CORE
n/a Vital Signs (24 Hrs):  T(C): 37.2 (03-30-20 @ 15:19), Max: 37.2 (03-30-20 @ 11:26)  HR: 103 (03-30-20 @ 15:19) (103 - 126)  BP: 140/85 (03-30-20 @ 15:19) (140/85 - 148/78)  RR: 22 (03-30-20 @ 15:19) (22 - 24)  SpO2: 100% (03-30-20 @ 15:19) (92% - 100%)  Wt(kg): --    PHYSICAL EXAM:  GENERAL: Obese female in NAD, speaking in full sentences, no accessory muscle use  HEENT:  DMM, - JVD  CHEST/LUNG: Clear to auscultation bilaterally; No wheezes  HEART: Tachchycardic S1S2.  ABDOMEN: NTND BS+4. no g/r/r  EXTREMITIES:  2+ Peripheral Pulses. No LE edema  PSYCH: AAOx3, cooperative, appropriate  NEUROLOGY: AAOx3, CN II-XII intact. Strength 5/5 throughout. Sensation intact. Appropriate cerebellar functions.   SKIN: No rashes or lesions

## 2023-06-29 ENCOUNTER — NON-APPOINTMENT (OUTPATIENT)
Age: 68
End: 2023-06-29

## 2023-07-11 ENCOUNTER — APPOINTMENT (OUTPATIENT)
Dept: BREAST CENTER | Facility: CLINIC | Age: 68
End: 2023-07-11

## 2023-07-11 ENCOUNTER — APPOINTMENT (OUTPATIENT)
Dept: BREAST CENTER | Facility: CLINIC | Age: 68
End: 2023-07-11
Payer: MEDICARE

## 2023-07-11 VITALS
OXYGEN SATURATION: 98 % | HEIGHT: 60 IN | WEIGHT: 207 LBS | SYSTOLIC BLOOD PRESSURE: 149 MMHG | HEART RATE: 93 BPM | DIASTOLIC BLOOD PRESSURE: 90 MMHG | BODY MASS INDEX: 40.64 KG/M2

## 2023-07-11 DIAGNOSIS — Z85.3 PERSONAL HISTORY OF MALIGNANT NEOPLASM OF BREAST: ICD-10-CM

## 2023-07-11 DIAGNOSIS — Z98.890 OTHER SPECIFIED POSTPROCEDURAL STATES: ICD-10-CM

## 2023-07-11 DIAGNOSIS — Z12.39 ENCOUNTER FOR OTHER SCREENING FOR MALIGNANT NEOPLASM OF BREAST: ICD-10-CM

## 2023-07-11 PROCEDURE — 99213 OFFICE O/P EST LOW 20 MIN: CPT

## 2023-07-11 NOTE — DATA REVIEWED
[FreeTextEntry1] : -- 1/16/19 ([facility]) b/l mmg: left breast small group of microcalcs, below the lumpectomy site, recommend additional spot compression views. Left breast, subcentimeter nodularity behind the nipple adjacent to lumpectomy site; patient is scheduled for f/u breast MRI. BI-RADS 0. \par \par -- 2/17/19 ([facility]) MRI breasts: 1 cm enhancing lesion at 7N16.5 in the right breast, recommend US bx if amendable to US. Left breast, 0.6 cm enhancing lesion within the skin at 10N4.5 recommend targeted US. Left breast 0.5 cm 10N6.5 suspicious enhancing lesion, recommend targeted US with biopsy is amendable, otherwise MRI bx is recommended. BI-RADS 4. \par \par -- 4/4/19 ([facility]) US breasts b/l: No lesion visualized to correspond to the right breast lesion at 7n16.5 seen on MRI; however pt reports a history of a "boil" which has resolved, which may have corresponded, this can be confirmed on the day of the left breast MRI bx. Left breast 0.8 x 0.7 cm hypoechoic lesion within the skin at 10N4.5 corresponds to 0.6 cm lesion seen on MRI; given hx of cancer, biopsy is recommended. No sono abnormality seen at the 10N6.5 to correspond to 0.5 cm lesion seen on MRI; MRI bx is recommended. BI-RADS 4.\par \par -- 5/17/19 ([facility]) left breast 10n4.5 MRI biopsy path: minute narrow linear structure with atypical epithelial cells. Non-epithelial lined cyst 1.75 mm w/ fibrous wall c/w old fat necrosis; rare minute atrophic lobules showing alterations c/w treatment effect. This biopsy does not explain the presence of the 0.5 cm lesion seen on MRI, please correlate with imaging studies. CONCORDANCE PENDING (radiology was contacted and will look into this). \par \par --3/9/2020 ([facility]) b/l mammogram showing no asymmetry in the medial left breast on the craniocaudal view at the site of most recent lumpectomy consistent with postoperative change. Diffuse left breast skin thickening and increased trabecular markings are consistent with prior radiation. Benign-appearing calcifications bilaterally are unchanged. There are no new suspicious masses or suspicious microcalcifications in either breast.\par Postoperative and postradiation changes in the left breast. Continued 6 month follow up left breast mammogram recommended as well as breast MRI. BIRADS 3 \par \par 9/10/2020 ([facility]) bilateral mammogram showing scattered areas of fibroglandular density, benign mamographic findings without interval change, no mammographic evidence of malignancy. Benign findings BIRADS 2\par \par 9/10/2020 ([facility]) bilateral breast MRI showing now MRI evidence of malignancy Benign findings BIRADS 2\par \par 9/13/21 ([facility]) b/l mmg: scattered fbg density. No e/o malignancy. BI-RADS 2.\par \par 9/13/21 ([facility]) MRI breasts: No suspicious enhancement to warrant biopsy. BI-RADS 2.  \par \par 9/19/22 ([facility]) B/l screening mammo and US: scattered fibroglandular density. LEFT breast upper outer quadrant, anterior depth, there is a new 6 mm cluster of coarse heterogenous calcifications. Further evaluation with stereotactic biopsy is recommended. BI-RADS 4A. \par \par 10/18/22 ([facility]) Left new calcifications left upper outer anterior: 1. Left breast, stereo core biopsy Benign breast tissue with fibrosis, fat necrosis, stromal calcifications and benign acini with treatment related changes IMPRESSION: These results are CONCORDANT and BENIGN. Recommend resuming annual mammography. \par

## 2023-07-11 NOTE — HISTORY OF PRESENT ILLNESS
[FreeTextEntry1] : Brandi is a 67 yo female who presents for breast cancer surveillance. She has a history of left invasive mammary carcinoma ER positive, NC negative, HER-2 equivocal FISH positive with positive left axillary LN s/p left lumpectomy, ALND on 3/14/2016.  Patient has no breast complaints. Denies palpable abnormalities of the breast, no skin changes/dimpling, no nipple discharge bilaterally.  She did mention that she occasionally has left upper extremity swelling which comes and goes.  She has no current complaints about her left upper extremity at this time.  I did recommend getting a sozo for monitoring.  She is no longer taking letrozole and states that she may have stopped around 2021.\par \par  \par Cancer History: \par Patient has a history of left 11:00 invasive mammary cancer (ER positive 40%/[place]-/HER2 equivocal FISH positive) with left axillary lymph node positive for metastatic adenocarcinoma, consistent with breast origin, LCIS and ALH, s/p neoadjuvant chemotherapy in 10/2015-1/2016, lumpectomy and ALND (2/29 LN positive) in 2016. S/p XRT in 2017. On letrozole, managed by Dr. Eid (med onc); will have completed 5 year course at the end of this month. \par \par

## 2023-07-11 NOTE — PHYSICAL EXAM
[Normocephalic] : normocephalic [EOMI] : extra ocular movement intact [Supple] : supple [Examined in the supine and seated position] : examined in the supine and seated position [No dominant masses] : no dominant masses in right breast  [No dominant masses] : no dominant masses left breast [No Nipple Retraction] : no right nipple retraction [No Nipple Discharge] : no left nipple discharge [No Axillary Lymphadenopathy] : no left axillary lymphadenopathy [No Rashes] : no rashes [de-identified] : Significant skin edema and radiation changes which have been stable according to patient, well-healed lumpectomy incision, retraction at the site of incision [de-identified] : Edema and radiation changes to left breast

## 2023-08-18 ENCOUNTER — APPOINTMENT (OUTPATIENT)
Dept: BREAST CENTER | Facility: CLINIC | Age: 68
End: 2023-08-18

## 2023-09-08 ENCOUNTER — RESULT REVIEW (OUTPATIENT)
Age: 68
End: 2023-09-08

## 2023-09-08 ENCOUNTER — APPOINTMENT (OUTPATIENT)
Dept: MAMMOGRAPHY | Facility: HOSPITAL | Age: 68
End: 2023-09-08
Payer: MEDICARE

## 2023-09-08 ENCOUNTER — APPOINTMENT (OUTPATIENT)
Dept: ULTRASOUND IMAGING | Facility: HOSPITAL | Age: 68
End: 2023-09-08
Payer: MEDICARE

## 2023-09-08 ENCOUNTER — OUTPATIENT (OUTPATIENT)
Dept: OUTPATIENT SERVICES | Facility: HOSPITAL | Age: 68
LOS: 1 days | End: 2023-09-08
Payer: MEDICARE

## 2023-09-08 DIAGNOSIS — Z98.89 OTHER SPECIFIED POSTPROCEDURAL STATES: Chronic | ICD-10-CM

## 2023-09-08 DIAGNOSIS — Z98.890 OTHER SPECIFIED POSTPROCEDURAL STATES: Chronic | ICD-10-CM

## 2023-09-08 DIAGNOSIS — Z96.653 PRESENCE OF ARTIFICIAL KNEE JOINT, BILATERAL: Chronic | ICD-10-CM

## 2023-09-08 DIAGNOSIS — Z96.643 PRESENCE OF ARTIFICIAL HIP JOINT, BILATERAL: Chronic | ICD-10-CM

## 2023-09-08 PROCEDURE — 77067 SCR MAMMO BI INCL CAD: CPT | Mod: 26

## 2023-09-08 PROCEDURE — 77063 BREAST TOMOSYNTHESIS BI: CPT

## 2023-09-08 PROCEDURE — 77067 SCR MAMMO BI INCL CAD: CPT

## 2023-09-08 PROCEDURE — 76641 ULTRASOUND BREAST COMPLETE: CPT | Mod: 26,50

## 2023-09-08 PROCEDURE — 77063 BREAST TOMOSYNTHESIS BI: CPT | Mod: 26

## 2023-09-08 PROCEDURE — 76641 ULTRASOUND BREAST COMPLETE: CPT

## 2023-09-11 ENCOUNTER — NON-APPOINTMENT (OUTPATIENT)
Age: 68
End: 2023-09-11

## 2024-08-05 ENCOUNTER — NON-APPOINTMENT (OUTPATIENT)
Age: 69
End: 2024-08-05

## 2024-09-09 ENCOUNTER — APPOINTMENT (OUTPATIENT)
Dept: MAMMOGRAPHY | Facility: HOSPITAL | Age: 69
End: 2024-09-09

## 2024-09-09 ENCOUNTER — APPOINTMENT (OUTPATIENT)
Dept: ULTRASOUND IMAGING | Facility: HOSPITAL | Age: 69
End: 2024-09-09

## 2024-09-13 ENCOUNTER — NON-APPOINTMENT (OUTPATIENT)
Age: 69
End: 2024-09-13

## 2024-09-25 NOTE — ED PROVIDER NOTE - RELIEVING FACTORS
Detail Level: Detailed Depth Of Biopsy: dermis Was A Bandage Applied: Yes Size Of Lesion In Cm: 0.5 X Size Of Lesion In Cm: 0 Biopsy Type: H and E Biopsy Method: 15 blade Anesthesia Type: 1% lidocaine with epinephrine Hemostasis: Electrocautery none Wound Care: Petrolatum Dressing: bandage Destruction After The Procedure: No Type Of Destruction Used: Curettage Curettage Text: The wound bed was treated with curettage after the biopsy was performed. Cryotherapy Text: The wound bed was treated with cryotherapy after the biopsy was performed. Electrodesiccation Text: The wound bed was treated with electrodesiccation after the biopsy was performed. Electrodesiccation And Curettage Text: The wound bed was treated with electrodesiccation and curettage after the biopsy was performed. Silver Nitrate Text: The wound bed was treated with silver nitrate after the biopsy was performed. Lab: [id number] Lab Facility: [id number] Consent: Verbal consent was obtained and risks were reviewed including but not limited to scarring, infection, bleeding, scabbing, incomplete removal, nerve damage and allergy to anesthesia. Post-Care Instructions: I reviewed with the patient in detail post-care instructions. Patient is to keep the biopsy site dry overnight, and then apply Vaseline daily until healed. Notification Instructions: Patient will be notified of biopsy results. However, patient instructed to call the office if not contacted within 2 weeks. Billing Type: Third-Party Bill Information: Selecting Yes will display possible errors in your note based on the variables you have selected. This validation is only offered as a suggestion for you. PLEASE NOTE THAT THE VALIDATION TEXT WILL BE REMOVED WHEN YOU FINALIZE YOUR NOTE. IF YOU WANT TO FAX A PRELIMINARY NOTE YOU WILL NEED TO TOGGLE THIS TO 'NO' IF YOU DO NOT WANT IT IN YOUR FAXED NOTE.

## 2024-10-16 ENCOUNTER — NON-APPOINTMENT (OUTPATIENT)
Age: 69
End: 2024-10-16

## 2024-10-17 ENCOUNTER — NON-APPOINTMENT (OUTPATIENT)
Age: 69
End: 2024-10-17

## 2024-10-18 ENCOUNTER — APPOINTMENT (OUTPATIENT)
Dept: BREAST CENTER | Facility: CLINIC | Age: 69
End: 2024-10-18
Payer: MEDICARE

## 2024-10-18 VITALS — WEIGHT: 207 LBS | HEIGHT: 71 IN | BODY MASS INDEX: 28.98 KG/M2

## 2024-10-18 DIAGNOSIS — Z85.3 PERSONAL HISTORY OF MALIGNANT NEOPLASM OF BREAST: ICD-10-CM

## 2024-10-18 DIAGNOSIS — C77.3 MALIGNANT NEOPLASM OF UNSPECIFIED SITE OF UNSPECIFIED FEMALE BREAST: ICD-10-CM

## 2024-10-18 DIAGNOSIS — Z98.890 OTHER SPECIFIED POSTPROCEDURAL STATES: ICD-10-CM

## 2024-10-18 DIAGNOSIS — Z87.2 PERSONAL HISTORY OF DISEASES OF THE SKIN AND SUBCUTANEOUS TISSUE: ICD-10-CM

## 2024-10-18 DIAGNOSIS — Z17.0 PERSONAL HISTORY OF MALIGNANT NEOPLASM OF BREAST: ICD-10-CM

## 2024-10-18 DIAGNOSIS — C50.919 MALIGNANT NEOPLASM OF UNSPECIFIED SITE OF UNSPECIFIED FEMALE BREAST: ICD-10-CM

## 2024-10-18 DIAGNOSIS — R92.8 OTHER ABNORMAL AND INCONCLUSIVE FINDINGS ON DIAGNOSTIC IMAGING OF BREAST: ICD-10-CM

## 2024-10-18 DIAGNOSIS — Z12.39 ENCOUNTER FOR OTHER SCREENING FOR MALIGNANT NEOPLASM OF BREAST: ICD-10-CM

## 2024-10-18 PROCEDURE — 99213 OFFICE O/P EST LOW 20 MIN: CPT

## 2025-01-08 NOTE — PHYSICAL EXAM
[Normocephalic] : normocephalic [Atraumatic] : atraumatic [Supple] : supple [No Supraclavicular Adenopathy] : no supraclavicular adenopathy [No dominant masses] : no dominant masses in right breast  [Examined in the supine and seated position] : examined in the supine and seated position [No Nipple Retraction] : no right nipple retraction Yes [No Nipple Discharge] : no left nipple discharge [No Axillary Lymphadenopathy] : no right axillary lymphadenopathy [No Rashes] : no rashes [No Edema] : no edema [No Ulceration] : no ulceration
